# Patient Record
Sex: FEMALE | Race: BLACK OR AFRICAN AMERICAN | NOT HISPANIC OR LATINO | Employment: FULL TIME | URBAN - METROPOLITAN AREA
[De-identification: names, ages, dates, MRNs, and addresses within clinical notes are randomized per-mention and may not be internally consistent; named-entity substitution may affect disease eponyms.]

---

## 2017-02-15 ENCOUNTER — PATIENT MESSAGE (OUTPATIENT)
Dept: INTERNAL MEDICINE | Facility: CLINIC | Age: 27
End: 2017-02-15

## 2017-02-15 ENCOUNTER — OFFICE VISIT (OUTPATIENT)
Dept: SPORTS MEDICINE | Facility: CLINIC | Age: 27
End: 2017-02-15
Payer: COMMERCIAL

## 2017-02-15 ENCOUNTER — HOSPITAL ENCOUNTER (OUTPATIENT)
Dept: RADIOLOGY | Facility: HOSPITAL | Age: 27
Discharge: HOME | End: 2017-02-15
Attending: FAMILY MEDICINE
Payer: COMMERCIAL

## 2017-02-15 ENCOUNTER — TELEPHONE (OUTPATIENT)
Dept: INTERNAL MEDICINE | Facility: CLINIC | Age: 27
End: 2017-02-15

## 2017-02-15 VITALS — WEIGHT: 149 LBS | BODY MASS INDEX: 23.39 KG/M2 | HEIGHT: 67 IN | TEMPERATURE: 98 F

## 2017-02-15 DIAGNOSIS — M25.519 SHOULDER PAIN, UNSPECIFIED CHRONICITY, UNSPECIFIED LATERALITY: Primary | ICD-10-CM

## 2017-02-15 DIAGNOSIS — M25.511 RIGHT SHOULDER PAIN, UNSPECIFIED CHRONICITY: ICD-10-CM

## 2017-02-15 DIAGNOSIS — M12.811 ROTATOR CUFF ARTHROPATHY, RIGHT: Primary | ICD-10-CM

## 2017-02-15 PROCEDURE — 99243 OFF/OP CNSLTJ NEW/EST LOW 30: CPT | Mod: S$GLB,,, | Performed by: FAMILY MEDICINE

## 2017-02-15 PROCEDURE — 73030 X-RAY EXAM OF SHOULDER: CPT | Mod: TC,PO,RT

## 2017-02-15 PROCEDURE — 73030 X-RAY EXAM OF SHOULDER: CPT | Mod: 26,RT,, | Performed by: RADIOLOGY

## 2017-02-15 PROCEDURE — 99999 PR PBB SHADOW E&M-EST. PATIENT-LVL III: CPT | Mod: PBBFAC,,, | Performed by: FAMILY MEDICINE

## 2017-02-15 RX ORDER — MELOXICAM 7.5 MG/1
7.5 TABLET ORAL DAILY
Qty: 15 TABLET | Refills: 0 | Status: SHIPPED | OUTPATIENT
Start: 2017-02-15 | End: 2017-06-14

## 2017-02-15 NOTE — MR AVS SNAPSHOT
[facility]  [address]  [facility] [address]  Phone: [phone]                  Am Scot Whitehead   2/15/2017 2:15 PM   Appointment    Description:  Female : 1990   Provider:  Carrillo Vasquez MD   Department:  [facility]                To Do List           Goals (5 Years of Data)     None      [facility]     [facility] Nurse Care Line -  Assistance  Registered nurses in the [facility] Call Center provide clinical advisement, health education, appointment booking, and other advisory services.  Call for this free service at [phone].             Medications           Message regarding Medications     Verify the changes and/or additions to your medication regime listed below are the same as discussed with your clinician today.  If any of these changes or additions are incorrect, please notify your healthcare provider.             Verify that the below list of medications is an accurate representation of the medications you are currently taking.  If none reported, the list may be blank. If incorrect, please contact your healthcare provider. Carry this list with you in case of emergency.           Current Medications     fluticasone (FLONASE) 50 mcg/actuation nasal spray 1 spray by Each Nare route once daily.           Clinical Reference Information           Allergies as of 2/15/2017     No Known Allergies      Immunizations Administered on Date of Encounter - 2/15/2017     None      Language Assistance Services     ATTENTION: Language assistance services are available, free of charge. Please call [phone].      ATENCIÓN: Si habla español, tiene a sena disposición servicios gratuitos de asistencia lingüística. Llame al [phone].     CHÚ Ý: N?u b?n nói Ti?ng Vi?t, có các d?ch v? h? tr? ngôn ng? mi?n phí dành cho b?n. G?i s? [phone].         [facility] complies with applicable Federal civil rights  laws and does not discriminate on the basis of race, color, national origin, age, disability, or sex.

## 2017-02-15 NOTE — PROGRESS NOTES
Coby Ellison Erinn Whitehead, a 26 y.o. female, is here for evaluation of RIGHT shoulder pain.     This patient visit is a consult from the following provider: Bethany Arnold MD  Today's office visit notes will be made available to the consulting/refering provider via the mail, a fax, and/or an in basket message through the electronic medical record    HISTORY OF PRESENT ILLNESS  Hand dominance, right    Location:  anterior and lateral, right  Onset:  Insidious, early February 2017   Palliative:     Relative rest   Oral analgesics   Ice  Provocative:    ADLS  Yoga/aerial activities   Prior:  none  Progression:  worsening discomfort  Quality:    Sharp with activity  Radiation:  none  Severity:  per nursing documentation  Timing:  intermittent with use  Trauma:  None known    Review of systems (ROS):  A 10+ review of systems was performed with pertinent positives and negatives noted above in the history of present illness. Other systems were negative unless otherwise specified.    PHYSICAL EXAMINATION  General:  The patient is alert and oriented x 3. Mood is pleasant. Observation of ears, eyes and nose reveal no gross abnormalities. HEENT: NCAT, sclera anicteric. Lungs: Respirations are equal and unlabored.     RIGHT SHOULDER EXAMINATION     Observation:   Swelling:  none   Deformity:  none  Discoloration: none     Scapular: winging none  Scars: none     Atrophy:  none     Tenderness / Crepitus (T/C):   T/C         T/C  Clavicle    -/-   Supraspinatus    -/-    AC Jt.     -/-   Infraspinatus   -/-   SC Jt.      -/-   Deltoid      -/-     G. Tuberosity    -/-   LH Bicep groove  -/-  Acromion:   -/-   Midline Neck     -/-    Scapular Spine  -/-   Trapezium    -/-  SMA Scapula   -/-   GH jt. line - post   -/-    Scapulothoracic  -/-     ROM: (* = with pain)     Left shoulder    Right shoulder   AROM (PROM)    AROM (PROM)  FE       170° (175°)      170° (175°)*   ER at 0°      60° (65°)      90° (90°)*  ER at 90° ABD    90° (90°)      90° (90°)*  IR at 90° ABD    NA (40°)      NA (40°)*    IR (spine level)    T10       T10       Strength: (* = with pain)   Left shoulder    Right shoulder  SCAPTION    5/5      4/5*   IR      5/5       5/5  ER      5/5       5/5  BICEPS    5/5       5/5  Deltoid     5/5       5/5       Signs: Painful side      NEER     -       OOBDULIAS    +   DIEGO     +       SPEEDS    +    DROP ARM     -     BELLY PRESS  -  Superior escape  none      LIFT-OFF   +  X-Body ADD      +       MOVING VALGUS  -         Stability Testing:    Left shoulder    Right shoulder   Translation    Anterior   up face       up face  Posterior    up face      up face  Sulcus     < 10mm     < 10 mm     Signs  Apprehension    neg        neg      Relocation     no change      no change     Jerk test    neg        neg     Extremity Neuro-vascular Testing:  Sensation grossly intact to light touch all dermatomal regions.   DTR 2+ Biceps, Triceps, BR and Negative Alfreds sign  Grossly intact motor function at Elbow, Wrist and Hand  Distal pulses radial and ulnar 2+, brisk cap refill, symmetric.       NECK: Painless FROM and spinous processes non-tender. Negative Spurlings sign.      Other Findings:    ASSESSMENT & PLAN   Assessment:   #1 supraspinatus tendinosis, right   W/ suspected glenoid labral inflammatory changes    No evidence of osseous pathology  No evidence of neurologic pathology  No evidence of vascular pathology    Imaging studies reviewed:   X-ray shoulder, right 17.02    Plan:  We discussed options including:  #1 watchful waiting  #2 physical therapy aimed at:   RoM glenohumeral joint   Strengthening rotator cuff   Scapular stability  #3 injection therapy:   CSI SAB    Right,     Left,    CSI iaGH    Right,     Left,    orthobiologics   #4 MRIa for further evaluation     The patient chooses #2    Pain management: handout given, #3 = meloxicam x 14 d  Bracing:   Physical therapy: fPT, @ [facility], begin as  above  Activity (e.g. sports, work) restrictions: as tolerated   school/vocation: yoga, aerial yoga    Follow up in 8-10 w  A/e fPT  Ineffective-->MRIa  Should symptoms worsen or fail to resolve, consider:  Revisiting the above options

## 2017-02-15 NOTE — LETTER
February 15, 2017      Bethany Arnold MD  [address]  [facility] [address]           [facility]  [address]  [facility] [address]  Phone: [phone]          Patient: Coby Whitehead   MR Number: [id number]   YOB: 1990   Date of Visit: 2/15/2017       Dear Dr. Bethany Arnold:    Thank you for referring Coby Whitehead to me for evaluation. Attached you will find relevant portions of my assessment and plan of care.    If you have questions, please do not hesitate to call me. I look forward to following Coby Whitehead along with you.    Sincerely,    Carrillo Vasquez MD    Enclosure  CC:  No Recipients    If you would like to receive this communication electronically, please contact [email or web address] or [phone] to request more information on [employer] access.    For providers and/or their staff who would like to refer a patient to [facility], please contact us through our one-stop-shop provider referral line, [facility], at [phone].    If you feel you have received this communication in error or would no longer like to receive these types of communications, please e-mail [email or web address]

## 2017-03-01 ENCOUNTER — CLINICAL SUPPORT (OUTPATIENT)
Dept: REHABILITATION | Facility: HOSPITAL | Age: 27
End: 2017-03-01
Attending: FAMILY MEDICINE
Payer: COMMERCIAL

## 2017-03-01 DIAGNOSIS — M25.511 RIGHT ANTERIOR SHOULDER PAIN: Primary | ICD-10-CM

## 2017-03-01 PROCEDURE — 97110 THERAPEUTIC EXERCISES: CPT | Performed by: PHYSICAL THERAPIST

## 2017-03-01 PROCEDURE — 97161 PT EVAL LOW COMPLEX 20 MIN: CPT | Performed by: PHYSICAL THERAPIST

## 2017-03-01 NOTE — PROGRESS NOTES
[facility]   PATIENT EVALUATION    Date: 03/01/2017  Start Time: 1:30  Stop Time: 2:30    Patient Name: Coby Whitehead  Clinic Number: [id number]  Age: 26 y.o.  Gender: female    Diagnosis:   Encounter Diagnosis   Name Primary?    Right anterior shoulder pain Yes       Referring Physician: Carrillo Vasquez, *  Treatment Orders: PT Eval and Treat      History     Past Medical History:   Diagnosis Date    Rhinitis     nonallergic/seasonal        Current Outpatient Prescriptions   Medication Sig    fluticasone (FLONASE) 50 mcg/actuation nasal spray 1 spray by Each Nare route once daily.    meloxicam (MOBIC) 7.5 MG tablet Take 1 tablet (7.5 mg total) by mouth once daily.     No current facility-administered medications for this visit.        Review of patient's allergies indicates:  No Known Allergies      Subjective     History of Present Condition: Pt reports having right shoulder pain for the past weeks from doing aerials (exercise based). Reports I was trying to do a bird of paradise - that night it was sore and the next day was very sore. Pt continued to have pain over the next few weeks. 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    Onset Date: 4 weeks ago  Date of Surgery: n/a  Precautions: none    Mechanism of Injury: sudden    Pain Location: right shoulder pain   Pain Description: Aching, Dull, Burning and Sharp  Current Pain: 0/10  Least Pain: 0/10  Worst Pain: 7/10  Aggravating Factors: IR, overhead motion  Relieving Factors: rest.ice, heat    Prior Therapy: with good success after bilateral knee surgeries.    Occupation: [employer]    Sports/Recreational Activities: aerials, working out, running, basketball  Extremity Dominance: R    Prior Level of Function: Independent  Functional Deficits Leading to Referral/Nature of Injury: none  Patient Therapy Goals: To get normal function back for aerial  competition  Cultural/Environmental/Spiritual Barriers to Treatment or Learning: none      Objective       Posture: increased right shoulder protraction      Palpation: TTP along right proximal biceps tendon and pec minor insertion    Shoulder Range of Motion:   Right shoulder  ER: 90  IR: 21    Left shoulder  ER: 92  IR: 48    Joint Mobility: increased anterior humeral head translation on right    Strength:   Right shoulder  Flex: 4/5  Abd: 4/5  IR: 5/5  ER: 3+/5    Left shoulder  Flex: 5/5  Abd: 5/5  IR: 5/5  ER: 4+/5    Scapular Control/Dyskinesis:      Normal / Subtle / Obvious Comments   Left Obvious Inferior angle/medial border   Right Obvious  Inferior angle/medial border     Special Tests: Right: + load and shift, + Graham's, + Hawkin's Diego, + Neer's, + Apprehension relocation - all on right      Treatment:   SL ER 2# 3x10  Prone ext 2# 3x10  ER walkout 3x10      Functional Limitations Reports - G Codes  Category: Mobility  Tool: FOTO  Score: 65                Assessment     This is a 26 y.o. female referred to outpatient physical therapy and presents with a medical diagnosis of right shoulder pain and demonstrates limitations as described in the problem list. Pt demonstrates good rehab potential. Pt will benefit from physcial therapy services in order to maximize pain free and/or functional use of right UE/shoulder. The following goals were discussed with the patient and patient is in agreement with them as to be addressed in the treatment plan. Pt was given a HEP consisting of functional scapular PREs. Pt verbally understood the instructions as they were given and demonstrated proper form and technique during therapy. Pt was advised to perform these exercises free of pain, and to stop performing them if pain occurs.     Medical necessity is demonstrated by the following problem list:   - Pain limits function of effected part for all activities  - Unable to participate in daily activities   - Requires  skilled supervision to complete and progress HEP  - Fall risk - impaired balance   - Continued inability to participate in vocational pursuits    Short Term Goals (6 Weeks):  - Pt will increase ROM of right shoulder IR = left shoulder IR.  - Pt will increase strength of right shoulder = grossly 4+/5  - Decrease Pain to 1/10 worst with therex  - Pt to self correct posture independently.  - Pt independent with HEP with progressions.     Long Term Goals (12 Weeks):  - Pt will increase ROM with right shoulder = negative impingement testing.  - Pt will increase strength of right shoulder = grossly 5/5 with subtle scapular winging.   - Decrease Pain to 0/10 with aerials.  - Pt to return to aerial yoga without c/o pain or instability.      Plan     Pt will be treated by physical therapy 1-2 times a week for 12 weeks for manual therapy, therapeutic exercise, home exercise program, patient education, and modalities PRN to achieve established goals. Am Asa may at times be seen by a PTA as part of the Rehab Team.     Therapist: MAXIMINO BYRNES, PT    I CERTIFY THE NEED FOR THESE SERVICES FURNISHED UNDER THIS PLAN OF TREATMENT AND WHILE UNDER MY CARE    Physician's comments: ________________________________________________________________________________________________________________________________________________    Physician's Name: ___________________________________

## 2017-03-06 ENCOUNTER — CLINICAL SUPPORT (OUTPATIENT)
Dept: REHABILITATION | Facility: HOSPITAL | Age: 27
End: 2017-03-06
Attending: FAMILY MEDICINE
Payer: COMMERCIAL

## 2017-03-06 DIAGNOSIS — M25.511 RIGHT ANTERIOR SHOULDER PAIN: Primary | ICD-10-CM

## 2017-03-06 PROCEDURE — 97110 THERAPEUTIC EXERCISES: CPT | Performed by: PHYSICAL THERAPIST

## 2017-03-08 NOTE — PROGRESS NOTES
Physical Therapy Daily Note     Date: 03/08/2017  Name: Coby Whitehead  Clinic Number: [id number]  Diagnosis:   Encounter Diagnosis   Name Primary?    Right anterior shoulder pain Yes     Physician: Carrillo Vasquez, *    Evaluation Date: 3/1/17  Date of Surgery: n/a  Visit #: 2  Start Time:  11:30  Stop Time:  12:30  Total Treatment Time: 60    Precautions: none    Subjective     Pt reports the right shoulder is sore but feeling angelica.r     Pain: 1/10    Objective     Measurements taken: none    Patient received individual therapy to increase strength, endurance and ROM with activities as follows:     Am Asa received therapeutic exercises to develop strength, endurance and ROM for 30 minutes including:   SL ER 2# 3x10  Prone ext 2# 3x10  SA punch 2# 3x10  ER walkout 3x10  No money 3x10  Ball wall CW, CCW x 20 ea  LM with ER 3x10      Am Asa received the following manual therapy techniques: Joint mobilizations and Soft tissue Mobilization were applied to the: right shoulder for 5 minutes.   Sleeper stretch  IR with post mobs    Written Home Exercises Provided: updated as per therex list  Pt demo good understanding of the education provided. Am Asa demonstrated good return demonstration of activities.     Education provided:  Am Asa verbalized good understanding of education provided.   No spiritual or educational barriers to learning identified.    Assessment     This is a 26 y.o. female referred to outpatient physical therapy and presents with a medical diagnosis of right anterior shoulder pain and demonstrates limitations as described in the problem list Pt prognosis is Good. Pt will continue to benefit from skilled outpatient physical therapy to address the deficits listed below in the problem list, provide pt/family education and to maximize pt's level of independence in the home and community environment.    Will the patient continue to benefit from  skilled PT intervention? yes      Medical necessity is demonstrated by:   - Pain limits function of effected part for all activities  - Unable to participate in daily activities   - Requires skilled supervision to complete and progress HEP  - Fall risk - impaired balance   - Continued inability to participate in vocational pursuits    Short Term Goals (6 Weeks):  - Pt will increase ROM of right shoulder IR = left shoulder IR.  - Pt will increase strength of right shoulder = grossly 4+/5  - Decrease Pain to 1/10 worst with therex  - Pt to self correct posture independently.  - Pt independent with HEP with progressions.      Long Term Goals (12 Weeks):  - Pt will increase ROM with right shoulder = negative impingement testing.  - Pt will increase strength of right shoulder = grossly 5/5 with subtle scapular winging.   - Decrease Pain to 0/10 with aerials.  - Pt to return to aerial yoga without c/o pain or instability.     Plan   Continue with established Plan of Care towards PT goals.      Therapist: MAXIMINO BYRNES, PT

## 2017-03-15 ENCOUNTER — CLINICAL SUPPORT (OUTPATIENT)
Dept: REHABILITATION | Facility: HOSPITAL | Age: 27
End: 2017-03-15
Attending: FAMILY MEDICINE
Payer: COMMERCIAL

## 2017-03-15 DIAGNOSIS — M25.511 RIGHT ANTERIOR SHOULDER PAIN: Primary | ICD-10-CM

## 2017-03-15 PROCEDURE — 97110 THERAPEUTIC EXERCISES: CPT | Performed by: PHYSICAL THERAPIST

## 2017-03-15 NOTE — PROGRESS NOTES
Physical Therapy Daily Note     Date: 03/15/2017  Name: Coby Whitehead  Clinic Number: [id number]  Diagnosis:   Encounter Diagnosis   Name Primary?    Right anterior shoulder pain Yes     Physician: Carrillo Vasquez, *    Evaluation Date: 3/1/17  Date of Surgery: n/a  Visit #: 3  Start Time:  11:30  Stop Time:  12:30  Total Treatment Time: 60    Precautions: none    Subjective     Pt reports the right shoulder is sore still.     Pain: 1/10    Objective     Measurements taken: none    Patient received individual therapy to increase strength, endurance and ROM with activities as follows:     Am Asa received therapeutic exercises to develop strength, endurance and ROM for 30 minutes including:   SL ER 2# 3x10  Prone ext 2# 3x10  SA punch 2# 3x10  ER walkout 3x10  No money 3x10  Ball wall CW, CCW x 20 ea  LM with ER 3x10  90/90 walkout 3x10  Combo 3x10        Am Asa received the following manual therapy techniques: Joint mobilizations and Soft tissue Mobilization were applied to the: right shoulder for 5 minutes.   Sleeper stretch  IR with post mobs    Written Home Exercises Provided: updated as per therex list  Pt demo good understanding of the education provided. Am Asa demonstrated good return demonstration of activities.     Education provided:  Am Asa verbalized good understanding of education provided.   No spiritual or educational barriers to learning identified.    Assessment     Tender along proximal biceps tendon. Pt continues to display anterior translation/instability. Continue to progress strength at higher ranges.    This is a 27 y.o. female referred to outpatient physical therapy and presents with a medical diagnosis of right anterior shoulder pain and demonstrates limitations as described in the problem list Pt prognosis is Good. Pt will continue to benefit from skilled outpatient physical therapy to address the deficits listed below in the  problem list, provide pt/family education and to maximize pt's level of independence in the home and community environment.    Will the patient continue to benefit from skilled PT intervention? yes      Medical necessity is demonstrated by:   - Pain limits function of effected part for all activities  - Unable to participate in daily activities   - Requires skilled supervision to complete and progress HEP  - Fall risk - impaired balance   - Continued inability to participate in vocational pursuits    Short Term Goals (6 Weeks):  - Pt will increase ROM of right shoulder IR = left shoulder IR.  - Pt will increase strength of right shoulder = grossly 4+/5  - Decrease Pain to 1/10 worst with therex  - Pt to self correct posture independently.  - Pt independent with HEP with progressions.      Long Term Goals (12 Weeks):  - Pt will increase ROM with right shoulder = negative impingement testing.  - Pt will increase strength of right shoulder = grossly 5/5 with subtle scapular winging.   - Decrease Pain to 0/10 with aerials.  - Pt to return to aerial yoga without c/o pain or instability.     Plan   Continue with established Plan of Care towards PT goals.      Therapist: MAXIMINO BYRNES, PT

## 2017-03-29 ENCOUNTER — CLINICAL SUPPORT (OUTPATIENT)
Dept: REHABILITATION | Facility: HOSPITAL | Age: 27
End: 2017-03-29
Attending: FAMILY MEDICINE
Payer: COMMERCIAL

## 2017-03-29 DIAGNOSIS — M25.511 RIGHT ANTERIOR SHOULDER PAIN: Primary | ICD-10-CM

## 2017-03-29 PROCEDURE — 97110 THERAPEUTIC EXERCISES: CPT | Performed by: PHYSICAL THERAPIST

## 2017-03-29 NOTE — PROGRESS NOTES
Physical Therapy Daily Note     Date: 03/29/2017  Name: Coby Whitehead  Clinic Number: [id number]  Diagnosis:   Encounter Diagnosis   Name Primary?    Right anterior shoulder pain Yes     Physician: Carrillo Vasquez, *    Evaluation Date: 3/1/17  Date of Surgery: n/a  Visit #: 4  Start Time:  11:30  Stop Time:  12:30  Total Treatment Time: 60    Precautions: none    Subjective     Pt reports the right shoulder is feeling better.     Pain: 1/10    Objective     Measurements taken: none    Patient received individual therapy to increase strength, endurance and ROM with activities as follows:     Am Asa received therapeutic exercises to develop strength, endurance and ROM for 40 minutes including:   SL ER 2# 3x10  Prone ext 2# 3x10  SA punch 2# 3x10  ER walkout 3x10  No money 3x10  Ball wall CW, CCW x 20 ea  LM with ER 3x10  90/90 walkout 3x10  Combo 3x10  Sit to stand D2 3x10  6 in step overs 3x10   ball wall arc 3x3        Am Asa received the following manual therapy techniques: Joint mobilizations and Soft tissue Mobilization were applied to the: right shoulder for 5 minutes.   Sleeper stretch  IR with post mobs    Written Home Exercises Provided: updated as per therex list  Pt demo good understanding of the education provided. Am Asa demonstrated good return demonstration of activities.     Education provided:  Am Asa verbalized good understanding of education provided.   No spiritual or educational barriers to learning identified.    Assessment     Shoulder stability and end range IR continue to improve with rx. Continue to progress closed chain loading and strength at 90/90/    This is a 27 y.o. female referred to outpatient physical therapy and presents with a medical diagnosis of right anterior shoulder pain and demonstrates limitations as described in the problem list Pt prognosis is Good. Pt will continue to benefit from skilled outpatient  physical therapy to address the deficits listed below in the problem list, provide pt/family education and to maximize pt's level of independence in the home and community environment.    Will the patient continue to benefit from skilled PT intervention? yes      Medical necessity is demonstrated by:   - Pain limits function of effected part for all activities  - Unable to participate in daily activities   - Requires skilled supervision to complete and progress HEP  - Fall risk - impaired balance   - Continued inability to participate in vocational pursuits    Short Term Goals (6 Weeks):  - Pt will increase ROM of right shoulder IR = left shoulder IR.  - Pt will increase strength of right shoulder = grossly 4+/5  - Decrease Pain to 1/10 worst with therex  - Pt to self correct posture independently.  - Pt independent with HEP with progressions.      Long Term Goals (12 Weeks):  - Pt will increase ROM with right shoulder = negative impingement testing.  - Pt will increase strength of right shoulder = grossly 5/5 with subtle scapular winging.   - Decrease Pain to 0/10 with aerials.  - Pt to return to aerial yoga without c/o pain or instability.     Plan   Continue with established Plan of Care towards PT goals.      Therapist: MAXIMINO BYRNES, PT

## 2017-04-03 ENCOUNTER — CLINICAL SUPPORT (OUTPATIENT)
Dept: REHABILITATION | Facility: HOSPITAL | Age: 27
End: 2017-04-03
Attending: FAMILY MEDICINE
Payer: COMMERCIAL

## 2017-04-03 DIAGNOSIS — M25.511 RIGHT ANTERIOR SHOULDER PAIN: Primary | ICD-10-CM

## 2017-04-03 PROCEDURE — 97110 THERAPEUTIC EXERCISES: CPT | Performed by: PHYSICAL THERAPIST

## 2017-04-03 NOTE — PROGRESS NOTES
Physical Therapy Daily Note     Date: 04/03/2017  Name: Coby Whitehead  Clinic Number: [id number]  Diagnosis:   Encounter Diagnosis   Name Primary?    Right anterior shoulder pain Yes     Physician: Carrillo Vasquez, *    Evaluation Date: 3/1/17  Date of Surgery: n/a  Visit #: 5  Start Time:  10:30  Stop Time:  11:30  Total Treatment Time: 60    Precautions: none    Subjective     Pt reports the right shoulder is feeling better.     Pain: 1/10    Objective     Measurements taken: none    Patient received individual therapy to increase strength, endurance and ROM with activities as follows:     Am Asa received therapeutic exercises to develop strength, endurance and ROM for 40 minutes including:   SL ER 2# 3x10  Prone ext 2# 3x10  SA punch 2# 3x10  ER walkout 3x10  No money 3x10  Ball wall CW, CCW x 20 ea  LM with ER 3x10  90/90 walkout 3x10  Combo 3x10  Sit to stand D2 3x10  6 in step overs 3x10   ball wall arc 3x3        Am Asa received the following manual therapy techniques: Joint mobilizations and Soft tissue Mobilization were applied to the: right shoulder for 5 minutes.   Sleeper stretch  IR with post mobs    Written Home Exercises Provided: updated as per therex list  Pt demo good understanding of the education provided. Am Asa demonstrated good return demonstration of activities.     Education provided:  Am Asa verbalized good understanding of education provided.   No spiritual or educational barriers to learning identified.    Assessment     Closed chain shoulder stability continues to improve with rx. Prepare for DC in next few visits.     This is a 27 y.o. female referred to outpatient physical therapy and presents with a medical diagnosis of right anterior shoulder pain and demonstrates limitations as described in the problem list Pt prognosis is Good. Pt will continue to benefit from skilled outpatient physical therapy to address the  deficits listed below in the problem list, provide pt/family education and to maximize pt's level of independence in the home and community environment.    Will the patient continue to benefit from skilled PT intervention? yes      Medical necessity is demonstrated by:   - Pain limits function of effected part for all activities  - Unable to participate in daily activities   - Requires skilled supervision to complete and progress HEP  - Fall risk - impaired balance   - Continued inability to participate in vocational pursuits    Short Term Goals (6 Weeks):  - Pt will increase ROM of right shoulder IR = left shoulder IR.  - Pt will increase strength of right shoulder = grossly 4+/5  - Decrease Pain to 1/10 worst with therex  - Pt to self correct posture independently.  - Pt independent with HEP with progressions.      Long Term Goals (12 Weeks):  - Pt will increase ROM with right shoulder = negative impingement testing.  - Pt will increase strength of right shoulder = grossly 5/5 with subtle scapular winging.   - Decrease Pain to 0/10 with aerials.  - Pt to return to aerial yoga without c/o pain or instability.     Plan   Continue with established Plan of Care towards PT goals.      Therapist: MAXIMINO BYRNES, PT

## 2017-04-10 ENCOUNTER — CLINICAL SUPPORT (OUTPATIENT)
Dept: REHABILITATION | Facility: HOSPITAL | Age: 27
End: 2017-04-10
Attending: FAMILY MEDICINE
Payer: COMMERCIAL

## 2017-04-10 DIAGNOSIS — M25.511 RIGHT ANTERIOR SHOULDER PAIN: Primary | ICD-10-CM

## 2017-04-10 PROCEDURE — 97110 THERAPEUTIC EXERCISES: CPT | Performed by: PHYSICAL THERAPIST

## 2017-04-10 NOTE — PROGRESS NOTES
Physical Therapy Daily Note     Date: 04/10/2017  Name: Coby Whitehead  Clinic Number: [id number]  Diagnosis:   Encounter Diagnosis   Name Primary?    Right anterior shoulder pain Yes     Physician: aCrrillo Vasquez, *    Evaluation Date: 3/1/17  Date of Surgery: n/a  Visit #: 6  Start Time:  11:30  Stop Time:  12:30  Total Treatment Time: 60    Precautions: none    Subjective     Pt reports the right shoulder is doing well. It only hurts with certain stretches but it is better.     Pain: 1/10    Objective     Measurements taken: none    Patient received individual therapy to increase strength, endurance and ROM with activities as follows:     Am Asa received therapeutic exercises to develop strength, endurance and ROM for 25 minutes including:   SL ER 3# 3x10  Prone ext 3# 3x10  ER walkout 3x10  No money 3x10  np Ball wall CW, CCW x 20 ea  LM with ER 3x10  90/90 walkout 3x10  Combo 3x10  Lunge with D2 3x10  np 6 in step overs 3x10          Am Asa received the following manual therapy techniques: Joint mobilizations and Soft tissue Mobilization were applied to the: right shoulder for 5 minutes.   Sleeper stretch  IR with post mobs    Written Home Exercises Provided: updated as per therex list  Pt demo good understanding of the education provided. Am Asa demonstrated good return demonstration of activities.     Education provided:  Am Asa verbalized good understanding of education provided.   No spiritual or educational barriers to learning identified.    FOTO = 87%    Assessment     Pt advised to avoid end range extension (combined with IR) stretch for her shoulder due to anterior instability. Otherwise excellent tolerance to posterior cuff and scapular stabilization therex at multiple ranges.     This is a 27 y.o. female referred to outpatient physical therapy and presents with a medical diagnosis of right anterior shoulder pain and demonstrates  limitations as described in the problem list Pt prognosis is Good. Pt will continue to benefit from skilled outpatient physical therapy to address the deficits listed below in the problem list, provide pt/family education and to maximize pt's level of independence in the home and community environment.    Will the patient continue to benefit from skilled PT intervention? yes      Medical necessity is demonstrated by:   - Pain limits function of effected part for all activities  - Unable to participate in daily activities   - Requires skilled supervision to complete and progress HEP  - Fall risk - impaired balance   - Continued inability to participate in vocational pursuits    Short Term Goals (6 Weeks):  - Pt will increase ROM of right shoulder IR = left shoulder IR.  - Pt will increase strength of right shoulder = grossly 4+/5  - Decrease Pain to 1/10 worst with therex  - Pt to self correct posture independently.  - Pt independent with HEP with progressions.      Long Term Goals (12 Weeks):  - Pt will increase ROM with right shoulder = negative impingement testing.  - Pt will increase strength of right shoulder = grossly 5/5 with subtle scapular winging.   - Decrease Pain to 0/10 with aerials.  - Pt to return to aerial yoga without c/o pain or instability.     Plan   Continue with established Plan of Care towards PT goals.      Therapist: MAXIMINO BYRNES, PT

## 2017-04-12 ENCOUNTER — OFFICE VISIT (OUTPATIENT)
Dept: SPORTS MEDICINE | Facility: CLINIC | Age: 27
End: 2017-04-12
Payer: COMMERCIAL

## 2017-04-12 VITALS — HEIGHT: 67 IN | WEIGHT: 146 LBS | TEMPERATURE: 98 F | BODY MASS INDEX: 22.91 KG/M2

## 2017-04-12 DIAGNOSIS — M25.511 CHRONIC RIGHT SHOULDER PAIN: ICD-10-CM

## 2017-04-12 DIAGNOSIS — M12.811 ROTATOR CUFF ARTHROPATHY, RIGHT: Primary | ICD-10-CM

## 2017-04-12 DIAGNOSIS — G89.29 CHRONIC RIGHT SHOULDER PAIN: ICD-10-CM

## 2017-04-12 PROCEDURE — 1160F RVW MEDS BY RX/DR IN RCRD: CPT | Mod: S$GLB,,, | Performed by: FAMILY MEDICINE

## 2017-04-12 PROCEDURE — 99999 PR PBB SHADOW E&M-EST. PATIENT-LVL III: CPT | Mod: PBBFAC,,, | Performed by: FAMILY MEDICINE

## 2017-04-12 PROCEDURE — 99214 OFFICE O/P EST MOD 30 MIN: CPT | Mod: S$GLB,,, | Performed by: FAMILY MEDICINE

## 2017-04-12 NOTE — MR AVS SNAPSHOT
[facility]  [address]  [facility] [address]  Phone: [phone]                  Am Scot Whitehead   2017 1:15 PM   Appointment    Description:  Female : 1990   Provider:  Carrillo Vasquez MD   Department:  [facility]                To Do List           Future Appointments        Provider Department Dept Phone    2017 1:15 PM Carrillo Vasquez MD [facility] [phone]    2017 5:30 PM Noe Robledo PT [facility] [phone]    2017 4:30 PM Noe Robledo PT [facility] [phone]      Goals (5 Years of Data)     None      [facility]     [facility] Care Line -  Assistance  Unless otherwise directed by your provider, please contact [facility], our nurse care line that is available for  assistance.     Registered nurses in the [facility] Center provide: appointment scheduling, clinical advisement, health education, and other advisory services.  Call: [phone] (toll free)               Medications           Message regarding Medications     Verify the changes and/or additions to your medication regime listed below are the same as discussed with your clinician today.  If any of these changes or additions are incorrect, please notify your healthcare provider.             Verify that the below list of medications is an accurate representation of the medications you are currently taking.  If none reported, the list may be blank. If incorrect, please contact your healthcare provider. Carry this list with you in case of emergency.           Current Medications     fluticasone (FLONASE) 50 mcg/actuation nasal spray 1 spray by Each Nare route once daily.    meloxicam (MOBIC) 7.5 MG tablet Take 1 tablet (7.5 mg total) by mouth once daily.           Clinical Reference Information           Allergies as of 2017     No Known  Allergies      Immunizations Administered on Date of Encounter - 4/12/2017     None      Language Assistance Services     ATTENTION: Language assistance services are available, free of charge. Please call [phone].      ATENCIÓN: Si burak lovett, tiene a sena disposición servicios gratuitos de asistencia lingüística. Llame al [phone].     CHÚ Ý: N?u b?n nói Ti?ng Vi?t, có các d?ch v? h? tr? ngôn ng? mi?n phí dành cho b?n. G?i s? [phone].         [facility] complies with applicable Federal civil rights laws and does not discriminate on the basis of race, color, national origin, age, disability, or sex.

## 2017-04-12 NOTE — PROGRESS NOTES
Coby Ellison Erinn Whitehead, a 27 y.o. female, is here for evaluation of RIGHT shoulder pain.     This patient visit is a consult from the following provider: Bethany Arnold MD  Today's office visit notes will be made available to the consulting/refering provider via the mail, a fax, and/or an in basket message through the electronic medical record    HISTORY OF PRESENT ILLNESS  Hand dominance, right    Location:  anterior and lateral, right  Onset:  Insidious, early February 2017   Palliative:     Relative rest   Oral analgesics   Ice   fPT @ [facility], [facility], improved strength, lingering pain remains  Provocative:    Decreased pain with    ADLs  Yoga/aerial activities   Prior:  none  Progression:  Slowly resolving discomfort  Quality:    Sharp with activity  Radiation:  none  Severity:  per nursing documentation  Timing:  intermittent with use  Trauma:  None known    Review of systems (ROS):  A 10+ review of systems was performed with pertinent positives and negatives noted above in the history of present illness. Other systems were negative unless otherwise specified.    PHYSICAL EXAMINATION  General:  The patient is alert and oriented x 3. Mood is pleasant. Observation of ears, eyes and nose reveal no gross abnormalities. HEENT: NCAT, sclera anicteric. Lungs: Respirations are equal and unlabored.     RIGHT SHOULDER EXAMINATION     Observation:   Swelling:  none   Deformity:  none  Discoloration: none     Scapular: winging none  Scars: none     Atrophy:  none     Tenderness / Crepitus (T/C):   T/C         T/C  Clavicle    -/-   Supraspinatus    -/-    AC Jt.     -/-   Infraspinatus   -/-   SC Jt.      -/-   Deltoid      -/-     G. Tuberosity    -/-   LH Bicep groove  -/-  Acromion:   -/-   Midline Neck     -/-    Scapular Spine  -/-   Trapezium    -/-  SMA Scapula   -/-   GH jt. line - post   -/-    Scapulothoracic  -/-     ROM: (* = with pain)     Left shoulder    Right shoulder   AROM (PROM)    AROM  (PROM)  FE       170° (175°)      170° (175°)*   ER at 0°      60° (65°)      90° (90°)*  ER at 90° ABD   90° (90°)      90° (90°)*  IR at 90° ABD    NA (40°)      NA (40°)*    IR (spine level)    T10       T10       Strength: (* = with pain)   Left shoulder    Right shoulder  SCAPTION    5/5      4/5*   IR      5/5       5/5  ER      5/5       5/5  BICEPS    5/5       5/5  Deltoid     5/5       5/5       Signs: Painful side      NEER     -       OOBDULIAS    +   DIEGO     +       SPEEDS    +    DROP ARM     -     BELLY PRESS  -  Superior escape  none      LIFT-OFF   +  X-Body ADD      +       MOVING VALGUS  -         Stability Testing:    Left shoulder    Right shoulder   Translation    Anterior   up face       up face  Posterior    up face      up face  Sulcus     < 10mm     < 10 mm     Signs  Apprehension    neg        neg      Relocation     no change      no change     Jerk test    neg        neg     Extremity Neuro-vascular Testing:  Sensation grossly intact to light touch all dermatomal regions.   DTR 2+ Biceps, Triceps, BR and Negative Alfreds sign  Grossly intact motor function at Elbow, Wrist and Hand  Distal pulses radial and ulnar 2+, brisk cap refill, symmetric.       NECK: Painless FROM and spinous processes non-tender. Negative Spurlings sign.      Other Findings:    ASSESSMENT & PLAN   Assessment:   #1 concern for glenoid labral tearing, right    No evidence of osseous pathology  No evidence of neurologic pathology  No evidence of vascular pathology    Imaging studies reviewed:   X-ray shoulder, right 17.02    Plan:  Given extreme dysfunction and discomfort, coupled with physical examination suggesting glenoid labral pathology, and with non-diagnostic x-ray imaging, we will obtain MRI arthrogram imaging for further evaluation of the glenoid labrum and associated soft tissue structures of the shoulder.    [We discussed options including:  #1 watchful waiting  #2 continue physical therapy  aimed at:   RoM glenohumeral joint   Strengthening rotator cuff   Scapular stability  #3 injection therapy:   CSI SAB    Right,     Left,    CSI iaGH    Right,     Left,    orthobiologics   #4      The patient chooses #2    Pain management: handout given, #3 = meloxicam (prior)  Bracing:   Physical therapy:    fPT, @ [facility], continue as above (DDixon)  Activity (e.g. sports, work) restrictions: as tolerated   school/vocation: yoga, aerial yoga, undergrad at [employer]     Follow up after MRIa  Should symptoms worsen or fail to resolve, consider:  Revisiting the above options

## 2017-04-12 NOTE — LETTER
April 12, 2017      Bethany Arnold MD  [address]  [facility] [address]           [facility]  [address]  [facility] [address]  Phone: [phone]          Patient: Coby Whitehead   MR Number: [id number]   YOB: 1990   Date of Visit: 4/12/2017       Dear Dr. Bethany Arnold:    Thank you for referring Coby Whitehead to me for evaluation. Attached you will find relevant portions of my assessment and plan of care.    If you have questions, please do not hesitate to call me. I look forward to following Coby Whitehead along with you.    Sincerely,    Carrillo Vasquez MD    Enclosure  CC:  No Recipients    If you would like to receive this communication electronically, please contact [email or web address] or [phone] to request more information on [employer] access.    For providers and/or their staff who would like to refer a patient to [facility], please contact us through our one-stop-shop provider referral line, [facility], at [phone].    If you feel you have received this communication in error or would no longer like to receive these types of communications, please e-mail [email or web address]

## 2017-04-17 ENCOUNTER — TELEPHONE (OUTPATIENT)
Dept: SPORTS MEDICINE | Facility: CLINIC | Age: 27
End: 2017-04-17

## 2017-04-17 ENCOUNTER — CLINICAL SUPPORT (OUTPATIENT)
Dept: REHABILITATION | Facility: HOSPITAL | Age: 27
End: 2017-04-17
Attending: FAMILY MEDICINE
Payer: COMMERCIAL

## 2017-04-17 DIAGNOSIS — M25.511 RIGHT ANTERIOR SHOULDER PAIN: Primary | ICD-10-CM

## 2017-04-17 PROCEDURE — 97110 THERAPEUTIC EXERCISES: CPT | Performed by: PHYSICAL THERAPIST

## 2017-04-17 NOTE — PROGRESS NOTES
Physical Therapy Daily Note     Date: 04/17/2017  Name: Coby Whitehead  Clinic Number: [id number]  Diagnosis:   Encounter Diagnosis   Name Primary?    Right anterior shoulder pain Yes     Physician: Carrillo Vasquez, *    Evaluation Date: 3/1/17  Date of Surgery: n/a  Visit #: 7  Start Time:  5:30  Stop Time:  6:30  Total Treatment Time: 60    Precautions: none    Subjective     Pt reports the right shoulder is doing great. I dont think I need more PT.     Pain: 1/10    Objective     Measurements taken: none    Patient received individual therapy to increase strength, endurance and ROM with activities as follows:     Am Asa received therapeutic exercises to develop strength, endurance and ROM for 25 minutes including:   SL ER 3# 3x10  Prone ext 3# 3x10  ER walkout 3x10  No money 3x10  np Ball wall CW, CCW x 20 ea  LM with ER 3x10  90/90 walkout 3x10  Combo 3x10  Lunge with D2 3x10  np 6 in step overs 3x10          Am Asa received the following manual therapy techniques: Joint mobilizations and Soft tissue Mobilization were applied to the: right shoulder for 5 minutes.   Sleeper stretch  IR with post mobs    Written Home Exercises Provided: updated as per therex list  Pt demo good understanding of the education provided. Am Asa demonstrated good return demonstration of activities.     Education provided:  Am Asa verbalized good understanding of education provided.   No spiritual or educational barriers to learning identified.    FOTO = 87%    Assessment     Excellent shoulder stability. Pt is independent with HEP and will try to maintain therex . To follow up with PT PRN.     This is a 27 y.o. female referred to outpatient physical therapy and presents with a medical diagnosis of right anterior shoulder pain and demonstrates limitations as described in the problem list Pt prognosis is Good. Pt will continue to benefit from skilled outpatient physical  therapy to address the deficits listed below in the problem list, provide pt/family education and to maximize pt's level of independence in the home and community environment.    Will the patient continue to benefit from skilled PT intervention? yes      Medical necessity is demonstrated by:   - Pain limits function of effected part for all activities  - Unable to participate in daily activities   - Requires skilled supervision to complete and progress HEP  - Fall risk - impaired balance   - Continued inability to participate in vocational pursuits    Short Term Goals (6 Weeks):  - Pt will increase ROM of right shoulder IR = left shoulder IR.  - Pt will increase strength of right shoulder = grossly 4+/5  - Decrease Pain to 1/10 worst with therex  - Pt to self correct posture independently.  - Pt independent with HEP with progressions.      Long Term Goals (12 Weeks):  - Pt will increase ROM with right shoulder = negative impingement testing.  - Pt will increase strength of right shoulder = grossly 5/5 with subtle scapular winging.   - Decrease Pain to 0/10 with aerials.  - Pt to return to aerial yoga without c/o pain or instability.     Plan   Continue with established Plan of Care towards PT goals.      Therapist: MAXIMINO BYRNES, PT

## 2017-04-17 NOTE — TELEPHONE ENCOUNTER
----- Message from Fiordaliza Galan sent at 4/17/2017 10:32 AM CDT -----  Contact: self/home  Pt would like to reschedule her MRI.

## 2017-04-25 ENCOUNTER — HOSPITAL ENCOUNTER (OUTPATIENT)
Dept: RADIOLOGY | Facility: HOSPITAL | Age: 27
Discharge: HOME | End: 2017-04-25
Attending: FAMILY MEDICINE
Payer: COMMERCIAL

## 2017-04-25 DIAGNOSIS — M12.811 ROTATOR CUFF ARTHROPATHY, RIGHT: ICD-10-CM

## 2017-04-25 PROCEDURE — 73222 MRI JOINT UPR EXTREM W/DYE: CPT | Mod: 26,RT,, | Performed by: RADIOLOGY

## 2017-04-25 PROCEDURE — 25500020 PHARM REV CODE 255: Performed by: FAMILY MEDICINE

## 2017-04-25 PROCEDURE — 73222 MRI JOINT UPR EXTREM W/DYE: CPT | Mod: TC,RT

## 2017-04-25 PROCEDURE — 25000003 PHARM REV CODE 250: Performed by: FAMILY MEDICINE

## 2017-04-25 PROCEDURE — 23350 INJECTION FOR SHOULDER X-RAY: CPT | Mod: ,,, | Performed by: RADIOLOGY

## 2017-04-25 PROCEDURE — 23350 INJECTION FOR SHOULDER X-RAY: CPT | Mod: TC

## 2017-04-25 PROCEDURE — 73040 CONTRAST X-RAY OF SHOULDER: CPT | Mod: 26,,, | Performed by: RADIOLOGY

## 2017-04-25 PROCEDURE — A9585 GADOBUTROL INJECTION: HCPCS | Performed by: FAMILY MEDICINE

## 2017-04-25 RX ORDER — LIDOCAINE HYDROCHLORIDE 10 MG/ML
2 INJECTION INFILTRATION; PERINEURAL ONCE
Status: COMPLETED | OUTPATIENT
Start: 2017-04-25 | End: 2017-04-25

## 2017-04-25 RX ORDER — GADOBUTROL 604.72 MG/ML
1 INJECTION INTRAVENOUS
Status: COMPLETED | OUTPATIENT
Start: 2017-04-25 | End: 2017-04-25

## 2017-04-25 RX ADMIN — IOHEXOL 2 ML: 300 INJECTION, SOLUTION INTRAVENOUS at 03:04

## 2017-04-25 RX ADMIN — LIDOCAINE HYDROCHLORIDE 2 ML: 10 INJECTION, SOLUTION INFILTRATION; PERINEURAL at 03:04

## 2017-04-25 RX ADMIN — GADOBUTROL 1 ML: 604.72 INJECTION INTRAVENOUS at 03:04

## 2017-04-26 ENCOUNTER — OFFICE VISIT (OUTPATIENT)
Dept: SPORTS MEDICINE | Facility: CLINIC | Age: 27
End: 2017-04-26
Payer: COMMERCIAL

## 2017-04-26 VITALS — WEIGHT: 146 LBS | BODY MASS INDEX: 22.91 KG/M2 | TEMPERATURE: 98 F | HEIGHT: 67 IN

## 2017-04-26 DIAGNOSIS — S43.431D SUPERIOR GLENOID LABRUM LESION OF RIGHT SHOULDER, SUBSEQUENT ENCOUNTER: Primary | ICD-10-CM

## 2017-04-26 PROCEDURE — 99999 PR PBB SHADOW E&M-EST. PATIENT-LVL III: CPT | Mod: PBBFAC,,, | Performed by: FAMILY MEDICINE

## 2017-04-26 PROCEDURE — 20610 DRAIN/INJ JOINT/BURSA W/O US: CPT | Mod: RT,S$GLB,, | Performed by: FAMILY MEDICINE

## 2017-04-26 PROCEDURE — 99214 OFFICE O/P EST MOD 30 MIN: CPT | Mod: 25,S$GLB,, | Performed by: FAMILY MEDICINE

## 2017-04-26 PROCEDURE — 1160F RVW MEDS BY RX/DR IN RCRD: CPT | Mod: S$GLB,,, | Performed by: FAMILY MEDICINE

## 2017-04-26 RX ORDER — TRIAMCINOLONE ACETONIDE 40 MG/ML
40 INJECTION, SUSPENSION INTRA-ARTICULAR; INTRAMUSCULAR
Status: DISCONTINUED | OUTPATIENT
Start: 2017-04-26 | End: 2017-04-26 | Stop reason: HOSPADM

## 2017-04-26 RX ADMIN — TRIAMCINOLONE ACETONIDE 40 MG: 40 INJECTION, SUSPENSION INTRA-ARTICULAR; INTRAMUSCULAR at 01:04

## 2017-04-26 NOTE — MR AVS SNAPSHOT
[facility]  [address]  [facility] [address]  Phone: [phone]                  Am Scot Whitehead   2017 1:15 PM   Appointment    Description:  Female : 1990   Provider:  Carrillo Vasquez MD   Department:  [facility]                To Do List           Future Appointments        Provider Department Dept Phone    2017 1:15 PM Carrillo Vasquez MD [facility] [phone]    5/3/2017 11:00 AM Liliana Campa NP [facility] [address] [phone]      Goals (5 Years of Data)     None      [facility]     [facility] On Call Nurse Care Line -  Assistance  Unless otherwise directed by your provider, please contact [facility], our nurse care line that is available for  assistance.     Registered nurses in the [facility] On Call Center provide: appointment scheduling, clinical advisement, health education, and other advisory services.  Call: [phone] (toll free)               Medications           Message regarding Medications     Verify the changes and/or additions to your medication regime listed below are the same as discussed with your clinician today.  If any of these changes or additions are incorrect, please notify your healthcare provider.             Verify that the below list of medications is an accurate representation of the medications you are currently taking.  If none reported, the list may be blank. If incorrect, please contact your healthcare provider. Carry this list with you in case of emergency.           Current Medications     fluticasone (FLONASE) 50 mcg/actuation nasal spray 1 spray by Each Nare route once daily.    meloxicam (MOBIC) 7.5 MG tablet Take 1 tablet (7.5 mg total) by mouth once daily.           Clinical Reference Information           Allergies as of 2017     No Known Allergies      Immunizations Administered on Date of Encounter - 2017     None       Language Assistance Services     ATTENTION: Language assistance services are available, free of charge. Please call [phone].      ATENCIÓN: Si habla bony, tiene a sena disposición servicios gratuitos de asistencia lingüística. Llame al [phone].     CHÚ Ý: N?u b?n nói Ti?ng Vi?t, có các d?ch v? h? tr? ngôn ng? mi?n phí dành cho b?n. G?i s? [phone].         [facility] complies with applicable Federal civil rights laws and does not discriminate on the basis of race, color, national origin, age, disability, or sex.

## 2017-04-26 NOTE — PROGRESS NOTES
Coby Ellison Erinn Whitehead, a 27 y.o. female, is here for evaluation of RIGHT shoulder pain.     This patient visit is a consult from the following provider: Bethany Arnold MD  Today's office visit notes will be made available to the consulting/refering provider via the mail, a fax, and/or an in basket message through the electronic medical record    HISTORY OF PRESENT ILLNESS  Hand dominance, right    Location:  anterior and lateral, right  Onset:  Insidious, early February 2017   Palliative:     Relative rest   Oral analgesics   Ice   fPT @ [facility], [facility], improved strength, lingering pain remains  Provocative:    Decreased pain with    ADLs  Yoga/aerial activities   Prior:  none  Progression:  Slowly resolving discomfort  Quality:    Sharp with activity  Radiation:  none  Severity:  per nursing documentation  Timing:  intermittent with use  Trauma:  None known    Review of systems (ROS):  A 10+ review of systems was performed with pertinent positives and negatives noted above in the history of present illness. Other systems were negative unless otherwise specified.    PHYSICAL EXAMINATION  General:  The patient is alert and oriented x 3. Mood is pleasant. Observation of ears, eyes and nose reveal no gross abnormalities. HEENT: NCAT, sclera anicteric. Lungs: Respirations are equal and unlabored.     RIGHT SHOULDER EXAMINATION     Observation:   Swelling:  none   Deformity:  none  Discoloration: none     Scapular: winging none  Scars: none     Atrophy:  none     Tenderness / Crepitus (T/C):   T/C         T/C  Clavicle    -/-   Supraspinatus    -/-    AC Jt.     -/-   Infraspinatus   -/-   SC Jt.      -/-   Deltoid      -/-     G. Tuberosity    -/-   LH Bicep groove  -/-  Acromion:   -/-   Midline Neck     -/-    Scapular Spine  -/-   Trapezium    -/-  SMA Scapula   -/-   GH jt. line - post   -/-    Scapulothoracic  -/-     ROM: (* = with pain)     Left shoulder    Right shoulder   AROM (PROM)    AROM  (PROM)  FE       170° (175°)      170° (175°)*   ER at 0°      60° (65°)      90° (90°)*  ER at 90° ABD   90° (90°)      90° (90°)*  IR at 90° ABD    NA (40°)      NA (40°)*    IR (spine level)    T10       T10       Strength: (* = with pain)   Left shoulder    Right shoulder  SCAPTION    5/5      4/5*   IR      5/5       5/5  ER      5/5       5/5  BICEPS    5/5       5/5  Deltoid     5/5       5/5       Signs: Painful side      NEER     -       OOBDULIAS    +   DIEGO     +       SPEEDS    +    DROP ARM     -     BELLY PRESS  -  Superior escape  none      LIFT-OFF   +  X-Body ADD      +       MOVING VALGUS  -         Stability Testing:    Left shoulder    Right shoulder   Translation    Anterior   up face       up face  Posterior    up face      up face  Sulcus     < 10mm     < 10 mm     Signs  Apprehension    neg        neg      Relocation     no change      no change     Jerk test    neg        neg     Extremity Neuro-vascular Testing:  Sensation grossly intact to light touch all dermatomal regions.   DTR 2+ Biceps, Triceps, BR and Negative Alfreds sign  Grossly intact motor function at Elbow, Wrist and Hand  Distal pulses radial and ulnar 2+, brisk cap refill, symmetric.       NECK: Painless FROM and spinous processes non-tender. Negative Spurlings sign.      Other Findings:    ASSESSMENT & PLAN   Assessment:   #1 inflammatory changes of superior glenoid labrum, right    No evidence of osseous pathology  No evidence of neurologic pathology  No evidence of vascular pathology    Imaging studies reviewed:   X-ray shoulder, right 17.02  MRIa shoulder, right 17.04    Plan:  We discussed options including:  #1 watchful waiting  #2 re start physical therapy aimed at:   RoM glenohumeral joint   Strengthening rotator cuff   Scapular stability  #3 injection therapy:   CSI SAB    Right,     Left,    CSI iaGH    Right,     Left,    orthobiologics   #4 consultation      The patient chooses #2 and #3 csi sab  right    Pain management: handout given, #3 = meloxicam (prior)  Bracing:   Physical therapy:    fPT, @ [facility], re start as above ([facility])  Activity (e.g. sports, work) restrictions: as tolerated   school/vocation: yoga, aerial yoga, undergrad at [employer]     Follow up in 2 w  A/e csi sab right   Ineffective-->#4  Should symptoms worsen or fail to resolve, consider:  Revisiting the above options

## 2017-05-03 ENCOUNTER — OFFICE VISIT (OUTPATIENT)
Dept: OBSTETRICS AND GYNECOLOGY | Facility: CLINIC | Age: 27
End: 2017-05-03
Payer: COMMERCIAL

## 2017-05-03 VITALS
DIASTOLIC BLOOD PRESSURE: 70 MMHG | WEIGHT: 147.69 LBS | HEIGHT: 67 IN | SYSTOLIC BLOOD PRESSURE: 110 MMHG | BODY MASS INDEX: 23.18 KG/M2

## 2017-05-03 DIAGNOSIS — Z01.419 WELL WOMAN EXAM WITH ROUTINE GYNECOLOGICAL EXAM: Primary | ICD-10-CM

## 2017-05-03 DIAGNOSIS — Z87.42 HISTORY OF ABNORMAL CERVICAL PAP SMEAR: ICD-10-CM

## 2017-05-03 DIAGNOSIS — Z97.5 IUD (INTRAUTERINE DEVICE) IN PLACE: ICD-10-CM

## 2017-05-03 DIAGNOSIS — Z01.419 PAP SMEAR, AS PART OF ROUTINE GYNECOLOGICAL EXAMINATION: ICD-10-CM

## 2017-05-03 PROCEDURE — 99395 PREV VISIT EST AGE 18-39: CPT | Mod: S$GLB,,, | Performed by: NURSE PRACTITIONER

## 2017-05-03 PROCEDURE — 87624 HPV HI-RISK TYP POOLED RSLT: CPT

## 2017-05-03 PROCEDURE — 88175 CYTOPATH C/V AUTO FLUID REDO: CPT | Performed by: PATHOLOGY

## 2017-05-03 PROCEDURE — 88141 CYTOPATH C/V INTERPRET: CPT | Mod: ,,, | Performed by: PATHOLOGY

## 2017-05-03 PROCEDURE — 99999 PR PBB SHADOW E&M-EST. PATIENT-LVL III: CPT | Mod: PBBFAC,,, | Performed by: NURSE PRACTITIONER

## 2017-05-03 NOTE — PROGRESS NOTES
CC: Annual  HPI: Pt is a 27 y.o.  female who presents for routine annual exam. She uses Mirena for contraception. She does not want STD screening. She does not have a cycle with her IUD. She had the IUD inserted in March 2015. She does not try to feel the strings. No family h/o breast cancer. No problems today.     Last pap-ASCUS with + HPV 3/2016 (colpo was done; needs repeat pap and co-testing today)      ROS:  GENERAL: Feeling well overall. Denies fever or chills.   SKIN: Denies rash or lesions.   HEAD: Denies head injury or headache.   NODES: Denies enlarged lymph nodes.   CHEST: Denies chest pain or shortness of breath.   CARDIOVASCULAR: Denies palpitations or left sided chest pain.   ABDOMEN: No abdominal pain, constipation, diarrhea, nausea, vomiting or rectal bleeding.   URINARY: No dysuria, hematuria, or burning on urination.  REPRODUCTIVE: See HPI.   BREASTS: Denies pain, lumps, or nipple discharge.   HEMATOLOGIC: No easy bruisability or excessive bleeding.   MUSCULOSKELETAL: Denies joint pain or swelling.   NEUROLOGIC: Denies syncope or weakness.   PSYCHIATRIC: Denies depression, anxiety or mood swings.    PE:   APPEARANCE: Well nourished, well developed, Black or  female in no acute distress.  NODES: no cervical, supraclavicular, or inguinal lymphadenopathy  BREASTS: Symmetrical, no skin changes or visible lesions. No palpable masses, nipple discharge or adenopathy bilaterally.  ABDOMEN: Soft. No tenderness or masses. No distention. No hernias palpated. No CVA tenderness.  VULVA: No lesions. Normal external female genitalia.  URETHRAL MEATUS: Normal size and location, no lesions, no prolapse.  URETHRA: No masses, tenderness, or prolapse.  VAGINA: Moist. No lesions or lacerations noted. No abnormal discharge present. No odor present.   CERVIX: No lesions or discharge. No cervical motion tenderness. IUD strings visualized-2 cm at os  UTERUS: Normal size, regular shape, mobile,  non-tender.  ADNEXA: No tenderness. No fullness or masses palpated in the adnexal regions.   ANUS PERINEUM: Normal.      Diagnosis:  1. Well woman exam with routine gynecological exam    2. Pap smear, as part of routine gynecological examination    3. History of abnormal cervical Pap smear    4. IUD (intrauterine device) in place-Mirena        Plan:     Orders Placed This Encounter    HPV High Risk Genotypes, PCR    Liquid-based pap smear, screening     -pap with co-testing done    Patient was counseled today on the new ACS guidelines for cervical cytology screening as well as the current recommendations for breast cancer screening. She was counseled to follow up with her PCP for other routine health maintenance. Counseling session lasted approximately 10 minutes, and all her questions were answered.    Follow-up with me in 1 year for routine exam; pap in 3 years.

## 2017-05-03 NOTE — MR AVS SNAPSHOT
"    [facility] [address]  [address]  [place] [address]  Phone: [phone]  Fax: [phone]                  Am Scot Whitehead   5/3/2017 3:00 PM   Office Visit    Description:  Female : 1990   Provider:  Liliana Campa NP   Department:  [facility] [address]           Reason for Visit     Gynecologic Exam                To Do List           Future Appointments        Provider Department Dept Phone    5/15/2017 1:15 PM Carrillo Vasquez MD [facility] [phone]      Goals (5 Years of Data)     None      [facility]     [facility] Nurse Care Line -  Assistance  Unless otherwise directed by your provider, please contact [facility], our nurse care line that is available for  assistance.     Registered nurses in the [facility] Center provide: appointment scheduling, clinical advisement, health education, and other advisory services.  Call: [phone] (toll free)               Medications           Message regarding Medications     Verify the changes and/or additions to your medication regime listed below are the same as discussed with your clinician today.  If any of these changes or additions are incorrect, please notify your healthcare provider.             Verify that the below list of medications is an accurate representation of the medications you are currently taking.  If none reported, the list may be blank. If incorrect, please contact your healthcare provider. Carry this list with you in case of emergency.           Current Medications     fluticasone (FLONASE) 50 mcg/actuation nasal spray 1 spray by Each Nare route once daily.    meloxicam (MOBIC) 7.5 MG tablet Take 1 tablet (7.5 mg total) by mouth once daily.           Clinical Reference Information           Your Vitals Were     BP Height Weight BMI       110/70 5' 7" (1.702 m) 67 kg (147 lb 11.3 oz) 23.13 kg/m2       Blood Pressure          Most Recent " Value    BP  110/70      Allergies as of 5/3/2017     No Known Allergies      Immunizations Administered on Date of Encounter - 5/3/2017     None      Language Assistance Services     ATTENTION: Language assistance services are available, free of charge. Please call [phone].      ATENCIÓN: Si jensla bony, tiene a sena disposición servicios gratuitos de asistencia lingüística. Llame al [phone].     CHÚ Ý: N?u b?n nói Ti?ng Vi?t, có các d?ch v? h? tr? ngôn ng? mi?n phí dành cho b?n. G?i s? [phone].         [facility] [address] complies with applicable Federal civil rights laws and does not discriminate on the basis of race, color, national origin, age, disability, or sex.

## 2017-05-09 PROBLEM — R87.810 ASCUS WITH POSITIVE HIGH RISK HPV CERVICAL: Status: ACTIVE | Noted: 2017-05-09

## 2017-05-09 PROBLEM — R87.610 ASCUS WITH POSITIVE HIGH RISK HPV CERVICAL: Status: ACTIVE | Noted: 2017-05-09

## 2017-05-09 LAB
HPV16 DNA SPEC QL NAA+PROBE: NEGATIVE
HPV16+18+H RISK 12 DNA CVX-IMP: POSITIVE
HPV18 DNA SPEC QL NAA+PROBE: NEGATIVE

## 2017-05-10 ENCOUNTER — PATIENT MESSAGE (OUTPATIENT)
Dept: OBSTETRICS AND GYNECOLOGY | Facility: CLINIC | Age: 27
End: 2017-05-10

## 2017-05-30 ENCOUNTER — PATIENT MESSAGE (OUTPATIENT)
Dept: OBSTETRICS AND GYNECOLOGY | Facility: CLINIC | Age: 27
End: 2017-05-30

## 2017-06-14 ENCOUNTER — PROCEDURE VISIT (OUTPATIENT)
Dept: OBSTETRICS AND GYNECOLOGY | Facility: CLINIC | Age: 27
End: 2017-06-14
Attending: OBSTETRICS & GYNECOLOGY
Payer: COMMERCIAL

## 2017-06-14 ENCOUNTER — PATIENT MESSAGE (OUTPATIENT)
Dept: OBSTETRICS AND GYNECOLOGY | Facility: CLINIC | Age: 27
End: 2017-06-14

## 2017-06-14 VITALS
HEIGHT: 66 IN | DIASTOLIC BLOOD PRESSURE: 72 MMHG | BODY MASS INDEX: 23.13 KG/M2 | SYSTOLIC BLOOD PRESSURE: 122 MMHG | WEIGHT: 143.94 LBS

## 2017-06-14 DIAGNOSIS — R87.810 ASCUS WITH POSITIVE HIGH RISK HPV CERVICAL: Primary | ICD-10-CM

## 2017-06-14 DIAGNOSIS — R87.610 ASCUS WITH POSITIVE HIGH RISK HPV CERVICAL: Primary | ICD-10-CM

## 2017-06-14 LAB
B-HCG UR QL: NEGATIVE
CTP QC/QA: YES

## 2017-06-14 PROCEDURE — 81025 URINE PREGNANCY TEST: CPT | Mod: QW,S$GLB,, | Performed by: OBSTETRICS & GYNECOLOGY

## 2017-06-14 PROCEDURE — 57454 BX/CURETT OF CERVIX W/SCOPE: CPT | Mod: S$GLB,,, | Performed by: OBSTETRICS & GYNECOLOGY

## 2017-06-14 PROCEDURE — 88305 TISSUE EXAM BY PATHOLOGIST: CPT | Mod: 26,,, | Performed by: PATHOLOGY

## 2017-06-14 PROCEDURE — 88342 IMHCHEM/IMCYTCHM 1ST ANTB: CPT | Mod: 26,,, | Performed by: PATHOLOGY

## 2017-06-14 PROCEDURE — 88305 TISSUE EXAM BY PATHOLOGIST: CPT | Mod: 59 | Performed by: PATHOLOGY

## 2017-06-14 NOTE — PROCEDURES
Procedures         Procedure Note        COLPOSCOPY    Am Scot Erinn Whitehead is a 27 y.o. female   presents for colposcopy.  No LMP recorded (lmp unknown). Patient is not currently having periods (Reason: Birth Control)..  Her most recent pap smear shows ASCUS with POSITIVE high risk HPV.      The patients's abnormal pap smear results were reviewed and  findings were discussed.  The natural history of HPV infection, need for colposcopy and possible biopsies to determine the plan of care, treatments available were discussed.  Minimal risk of bleeding/ infection with colposcopy was discussed.  Patient voiced understanding and agreed to proceed with colposcopy with possible biopsy.      UPT is negative    COLPOSCOPY EXAM:   TIME OUT PERFORMED.     acetowhite lesion(s) noted at 10 o'clock, IUD strings visible at external cervical os    Biopsy was taken at 10 o'clock.  ECC was performed    Hemostasis was adequate with pressure/ application of Monsel's solution.  The speculum was removed.  The patient did tolerate the procedure well.    All collected specimens sent to pathology for histologic analysis.    Post-colposcopy counseling:  The patient was instructed to manage post-colposcopy cramping with NSAIDs or Tylenol, or with a prescription per the medication card.  Avoid intercourse, douching, or tampons in the vagina for at least 2-3 days.  Additionally the patient was told to expect a clumpy blackish discharge due to Monsel's solution application for several days.  Patient was instructed to report heavy bleeding, worsening pain/  pain that does not respond to above medications, or foul-smelling vaginal discharge. HPV vaccine recommended according to FDA age guidelines.  Importance of follow-up stressed.      Follow up based on colposcopy results.    Syeda Silva DO  [phone]

## 2017-07-17 ENCOUNTER — PATIENT MESSAGE (OUTPATIENT)
Dept: OBSTETRICS AND GYNECOLOGY | Facility: CLINIC | Age: 27
End: 2017-07-17

## 2017-07-19 ENCOUNTER — TELEPHONE (OUTPATIENT)
Dept: OBSTETRICS AND GYNECOLOGY | Facility: CLINIC | Age: 27
End: 2017-07-19

## 2017-08-03 ENCOUNTER — PATIENT MESSAGE (OUTPATIENT)
Dept: OBSTETRICS AND GYNECOLOGY | Facility: CLINIC | Age: 27
End: 2017-08-03

## 2018-03-14 ENCOUNTER — PATIENT MESSAGE (OUTPATIENT)
Dept: OBSTETRICS AND GYNECOLOGY | Facility: CLINIC | Age: 28
End: 2018-03-14

## 2018-03-29 ENCOUNTER — PATIENT OUTREACH (OUTPATIENT)
Dept: INTERNAL MEDICINE | Facility: CLINIC | Age: 28
End: 2018-03-29

## 2018-03-29 NOTE — PROGRESS NOTES
[facility] is committed to your overall health.  To help you get the most out of each of your visits, we will review your information to make sure you are up to date on all of your recommended tests and/or procedures.       Your PCP  Bethany Arnold MD   found that you may be due for:       Health Maintenance Due   Topic Date Due    TETANUS VACCINE  03/09/2006    Influenza Vaccine  08/01/2017             If you have had any of the above done at another facility, please bring the records or information with you so that your record at [facility] will be complete.  If you would like to schedule any of these, please contact me.     If you are currently taking medication, please bring it with you to your appointment for review.     Also, if you have any type of Advanced Directives, please bring them with you to your office visit so we may scan them into your chart.       Thank you for Choosing [facility] for your healthcare needs.        Additional Information  If you have questions, you can email [email or web address] or call [phone]  to talk to our [facility] staff. Remember, [facility] is NOT to be used for urgent needs. For medical emergencies, dial 911.

## 2018-04-02 ENCOUNTER — OFFICE VISIT (OUTPATIENT)
Dept: FAMILY MEDICINE | Facility: CLINIC | Age: 28
End: 2018-04-02
Attending: FAMILY MEDICINE
Payer: COMMERCIAL

## 2018-04-02 ENCOUNTER — LAB VISIT (OUTPATIENT)
Dept: LAB | Facility: HOSPITAL | Age: 28
End: 2018-04-02
Attending: FAMILY MEDICINE
Payer: COMMERCIAL

## 2018-04-02 VITALS
HEART RATE: 68 BPM | DIASTOLIC BLOOD PRESSURE: 60 MMHG | SYSTOLIC BLOOD PRESSURE: 110 MMHG | BODY MASS INDEX: 23.7 KG/M2 | HEIGHT: 66 IN | WEIGHT: 147.5 LBS | OXYGEN SATURATION: 99 %

## 2018-04-02 DIAGNOSIS — R87.610 ASCUS WITH POSITIVE HIGH RISK HPV CERVICAL: ICD-10-CM

## 2018-04-02 DIAGNOSIS — E55.9 VITAMIN D DEFICIENCY: ICD-10-CM

## 2018-04-02 DIAGNOSIS — J31.0 CHRONIC NONALLERGIC RHINITIS: ICD-10-CM

## 2018-04-02 DIAGNOSIS — Z00.00 ROUTINE GENERAL MEDICAL EXAMINATION AT A HEALTH CARE FACILITY: Primary | ICD-10-CM

## 2018-04-02 DIAGNOSIS — R87.810 ASCUS WITH POSITIVE HIGH RISK HPV CERVICAL: ICD-10-CM

## 2018-04-02 DIAGNOSIS — Z97.5 IUD (INTRAUTERINE DEVICE) IN PLACE: ICD-10-CM

## 2018-04-02 LAB — 25(OH)D3+25(OH)D2 SERPL-MCNC: 12 NG/ML

## 2018-04-02 PROCEDURE — 99999 PR PBB SHADOW E&M-EST. PATIENT-LVL III: CPT | Mod: PBBFAC,,, | Performed by: FAMILY MEDICINE

## 2018-04-02 PROCEDURE — 99395 PREV VISIT EST AGE 18-39: CPT | Mod: 25,S$GLB,, | Performed by: FAMILY MEDICINE

## 2018-04-02 PROCEDURE — 82306 VITAMIN D 25 HYDROXY: CPT

## 2018-04-02 PROCEDURE — 90715 TDAP VACCINE 7 YRS/> IM: CPT | Mod: S$GLB,,, | Performed by: FAMILY MEDICINE

## 2018-04-02 PROCEDURE — 36415 COLL VENOUS BLD VENIPUNCTURE: CPT | Mod: PO

## 2018-04-02 PROCEDURE — 90471 IMMUNIZATION ADMIN: CPT | Mod: S$GLB,,, | Performed by: FAMILY MEDICINE

## 2018-04-02 RX ORDER — MINERAL OIL
180 ENEMA (ML) RECTAL DAILY
Qty: 30 TABLET | Refills: 2 | Status: SHIPPED | OUTPATIENT
Start: 2018-04-02 | End: 2019-06-26

## 2018-04-02 NOTE — PATIENT INSTRUCTIONS
Your test results will be communicated to you via : [facility], Telephone or Letter.   If you have not received your test results in one week, please contact the clinic at [phone].

## 2018-04-02 NOTE — PROGRESS NOTES
Subjective:       Patient ID: Coby Whitehead is a 28 y.o. female.    Chief Complaint: Annual Exam    HPI    The patient presents to the office today requesting a routine periodic health examination.  This is her first visit with me.  She is a former patient of Dr. Perez.      Patient Active Problem List   Diagnosis    Chronic nonallergic rhinitis    IUD in place-Mirena expires March 2020    ASCUS with positive high risk HPV cervical-needs colpo (May 2017)    Vitamin D deficiency       Past Surgical History:   Procedure Laterality Date    COLPOSCOPY  06/14/2017    KNEE ARTHROSCOPY W/ ACL RECONSTRUCTION Left     KNEE ARTHROSCOPY W/ MENISCAL REPAIR Right     NASAL POLYP EXCISION      w/septoplasty and sinus ostiotomy         Current Outpatient Prescriptions:     fluticasone (FLONASE) 50 mcg/actuation nasal spray, 1 spray by Each Nare route once daily., Disp: 16 g, Rfl: 11    levonorgestrel (MIRENA) 20 mcg/24 hr (5 years) IUD, 1 each by Intrauterine route once., Disp: , Rfl:     fexofenadine (ALLEGRA) 180 MG tablet, Take 1 tablet (180 mg total) by mouth once daily., Disp: 30 tablet, Rfl: 2    Review of patient's allergies indicates:  No Known Allergies    Family History   Problem Relation Age of Onset    Hypertension Mother     No Known Problems Father     No Known Problems Brother        Social History     Social History    Marital status: Single     Spouse name: N/A    Number of children: N/A    Years of education: N/A     Occupational History    Not on file.     Social History Main Topics    Smoking status: Never Smoker    Smokeless tobacco: Never Used    Alcohol use 1.2 oz/week     2 Standard drinks or equivalent per week    Drug use: No    Sexual activity: Not Currently     Birth control/ protection: IUD     Other Topics Concern    Not on file     Social History Narrative    The patient does exercise regularly (6d/wk).      She is satisfied with weight.    Rates diet as good.     She does not drink at least 1/2 gallon water daily.    She drinks 2 coffee/tea/caffeine-containing soft drinks daily.    Total sleep time at night is 6-7 hours.    She works 70 hours per week.    She does wear seat belts.    Hobbies include sewing.           OB History      Para Term  AB Living    0 0 0 0 0 0    SAB TAB Ectopic Multiple Live Births    0 0 0 0          Obstetric Comments    Menarche age 11.   Menses nearly absent on IUD.  History of abnormal PAP smear: YES.  History of sexually transmitted disease:  YES: HPV.               Patient Care Team:  Elbert Alegria Jr., MD as PCP - General (Family Medicine)  Syeda Silva DO as Obstetrician (Obstetrics and Gynecology)  Carrillo Vasquez MD as Consulting Physician (Sports Medicine)    Review of Systems   Constitutional: Negative for activity change, fatigue and unexpected weight change.   HENT: Positive for rhinorrhea. Negative for ear discharge, ear pain, hearing loss, tinnitus, trouble swallowing and voice change.    Eyes: Negative for discharge and visual disturbance.   Respiratory: Negative for cough, chest tightness, shortness of breath and wheezing.    Cardiovascular: Negative for chest pain, palpitations and leg swelling.   Gastrointestinal: Negative for abdominal pain, blood in stool, constipation, diarrhea, nausea and vomiting.   Endocrine: Negative for polydipsia and polyuria.   Genitourinary: Negative for difficulty urinating, dyspareunia, dysuria, frequency, hematuria and menstrual problem.   Musculoskeletal: Positive for arthralgias and joint swelling. Negative for back pain, myalgias and neck pain.   Skin: Negative for rash.   Neurological: Negative for dizziness, weakness, light-headedness and headaches.   Hematological: Does not bruise/bleed easily.   Psychiatric/Behavioral: Negative for confusion, dysphoric mood and sleep disturbance. The patient is not nervous/anxious.        Objective:      Physical Exam  "  Constitutional: She is oriented to person, place, and time. She appears well-developed and well-nourished. She is cooperative.   HENT:   Head: Normocephalic and atraumatic.   Nose: Nose normal.   Mouth/Throat: Oropharynx is clear and moist and mucous membranes are normal.   Eyes: Conjunctivae are normal. No scleral icterus.   Neck: Neck supple. No JVD present. Carotid bruit is not present. No thyromegaly present.   Cardiovascular: Normal rate, regular rhythm, normal heart sounds and normal pulses.  Exam reveals no gallop and no friction rub.    No murmur heard.  Pulmonary/Chest: Effort normal and breath sounds normal. She has no wheezes. She has no rhonchi. She has no rales.   Abdominal: Soft. Bowel sounds are normal. She exhibits no distension and no mass. There is no splenomegaly or hepatomegaly. There is no tenderness.   Musculoskeletal: Normal range of motion. She exhibits no edema or tenderness.   Lymphadenopathy:     She has no cervical adenopathy.     She has no axillary adenopathy.   Neurological: She is alert and oriented to person, place, and time. She has normal strength and normal reflexes. No cranial nerve deficit or sensory deficit.   Skin: Skin is warm and dry.   Psychiatric: She has a normal mood and affect. Her speech is normal.   Vitals reviewed.        Assessment:       1. Routine general medical examination at a health care facility    2. Chronic nonallergic rhinitis    3. ASCUS with positive high risk HPV cervical-needs colpo (May 2017)    4. IUD in place-Mirena expires March 2020    5. Vitamin D deficiency          Plan:       No labs needed.  Continue healthy lifestyle.  Discussed routine examinations and screenings.   TdaP today.  Discussed with patient the importance of lifestyle modifications, including well-balanced diet and moderate exercise regimen, in reducing risk for cardiovascular/cerebrovascular disease and diabetes.  RTC 2-3 years.      "This note will not be shared with the " "patient."  "

## 2018-04-04 ENCOUNTER — PATIENT MESSAGE (OUTPATIENT)
Dept: FAMILY MEDICINE | Facility: CLINIC | Age: 28
End: 2018-04-04

## 2018-04-04 DIAGNOSIS — E55.9 VITAMIN D DEFICIENCY: Primary | ICD-10-CM

## 2018-04-05 RX ORDER — VIT C/E/ZN/COPPR/LUTEIN/ZEAXAN 250MG-90MG
1000 CAPSULE ORAL DAILY
Refills: 0 | COMMUNITY
Start: 2018-04-05

## 2018-04-05 RX ORDER — ERGOCALCIFEROL 1.25 MG/1
50000 CAPSULE ORAL WEEKLY
Qty: 4 CAPSULE | Refills: 2 | Status: SHIPPED | OUTPATIENT
Start: 2018-04-05 | End: 2018-04-09 | Stop reason: SDUPTHER

## 2018-04-09 ENCOUNTER — PATIENT MESSAGE (OUTPATIENT)
Dept: FAMILY MEDICINE | Facility: CLINIC | Age: 28
End: 2018-04-09

## 2018-04-09 DIAGNOSIS — E55.9 VITAMIN D DEFICIENCY: Primary | ICD-10-CM

## 2018-04-09 RX ORDER — ERGOCALCIFEROL 1.25 MG/1
50000 CAPSULE ORAL WEEKLY
Qty: 4 CAPSULE | Refills: 2 | Status: SHIPPED | OUTPATIENT
Start: 2018-04-09 | End: 2018-07-02 | Stop reason: SDUPTHER

## 2018-04-17 ENCOUNTER — OFFICE VISIT (OUTPATIENT)
Dept: OBSTETRICS AND GYNECOLOGY | Facility: CLINIC | Age: 28
End: 2018-04-17
Attending: OBSTETRICS & GYNECOLOGY
Payer: COMMERCIAL

## 2018-04-17 VITALS
WEIGHT: 150.81 LBS | SYSTOLIC BLOOD PRESSURE: 120 MMHG | DIASTOLIC BLOOD PRESSURE: 57 MMHG | BODY MASS INDEX: 24.24 KG/M2 | HEIGHT: 66 IN

## 2018-04-17 DIAGNOSIS — Z97.5 IUD (INTRAUTERINE DEVICE) IN PLACE: ICD-10-CM

## 2018-04-17 DIAGNOSIS — R87.610 ASCUS WITH POSITIVE HIGH RISK HPV CERVICAL: ICD-10-CM

## 2018-04-17 DIAGNOSIS — R87.810 ASCUS WITH POSITIVE HIGH RISK HPV CERVICAL: ICD-10-CM

## 2018-04-17 DIAGNOSIS — Z01.419 WELL WOMAN EXAM WITH ROUTINE GYNECOLOGICAL EXAM: Primary | ICD-10-CM

## 2018-04-17 PROCEDURE — 99395 PREV VISIT EST AGE 18-39: CPT | Mod: S$GLB,,, | Performed by: OBSTETRICS & GYNECOLOGY

## 2018-04-17 PROCEDURE — 88175 CYTOPATH C/V AUTO FLUID REDO: CPT | Performed by: PATHOLOGY

## 2018-04-17 PROCEDURE — 87624 HPV HI-RISK TYP POOLED RSLT: CPT

## 2018-04-17 PROCEDURE — 88141 CYTOPATH C/V INTERPRET: CPT | Mod: ,,, | Performed by: PATHOLOGY

## 2018-04-17 NOTE — PROGRESS NOTES
CC: Well woman exam    [facility] Erinn Whitehead is a 28 y.o. female  presents for well woman exam.  LMP: No LMP recorded. Patient is not currently having periods (Reason: Birth Control)..  No issues, problems, or complaints.  Spots occasionally with mirena, checks strings and has noted any problems.  History of ASCUS/ non-16/18 positive pap in , DANIELA I colpo on 2017.  Returns for f/u.  Not currently sexually active.  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      Past Medical History:   Diagnosis Date    ASCUS with positive high risk HPV cervical- 2016    colposcopy    IUD (intrauterine device) in place     Mirena    Rhinitis     nonallergic/seasonal      Past Surgical History:   Procedure Laterality Date    COLPOSCOPY  2017    KNEE ARTHROSCOPY W/ ACL RECONSTRUCTION Left     KNEE ARTHROSCOPY W/ MENISCAL REPAIR Right     NASAL POLYP EXCISION      w/septoplasty and sinus ostiotomy     Social History     Social History    Marital status: Single     Spouse name: N/A    Number of children: N/A    Years of education: N/A     Occupational History    Not on file.     Social History Main Topics    Smoking status: Never Smoker    Smokeless tobacco: Never Used    Alcohol use 1.2 oz/week     2 Standard drinks or equivalent per week    Drug use: No    Sexual activity: Not Currently     Birth control/ protection: IUD     Other Topics Concern    Not on file     Social History Narrative    The patient does exercise regularly (6d/wk).      She is satisfied with weight.    Rates diet as good.    She does not drink at least 1/2 gallon water daily.    She drinks 2 coffee/tea/caffeine-containing soft drinks daily.    Total sleep time at night is 6-7 hours.    She works 70 hours per week.    She does wear seat belts.    Hobbies include sewing.         Family History   Problem Relation Age of Onset    Hypertension Mother     No Known  "Problems Father     No Known Problems Brother     Breast cancer Neg Hx     Ovarian cancer Neg Hx     Colon cancer Neg Hx      OB History      Para Term  AB Living    0 0 0 0 0 0    SAB TAB Ectopic Multiple Live Births    0 0 0 0          Obstetric Comments    Menarche age 11.   Menses nearly absent on IUD.  History of abnormal PAP smear: YES.  History of sexually transmitted disease:  YES: HPV.               BP (!) 120/57   Ht 5' 6" (1.676 m)   Wt 68.4 kg (150 lb 12.7 oz)   BMI 24.34 kg/m²       ROS:  GENERAL: Denies weight gain or weight loss. Feeling well overall.   SKIN: Denies rash or lesions.   HEAD: Denies head injury or headache.   NODES: Denies enlarged lymph nodes.   CHEST: Denies chest pain or shortness of breath.   CARDIOVASCULAR: Denies palpitations or left sided chest pain.   ABDOMEN: No abdominal pain, constipation, diarrhea, nausea, vomiting or rectal bleeding.   URINARY: No frequency, dysuria, hematuria, or burning on urination.  REPRODUCTIVE: See HPI.   BREASTS: The patient performs breast self-examination and denies pain, lumps, or nipple discharge.   HEMATOLOGIC: No easy bruisability or excessive bleeding.   MUSCULOSKELETAL: Denies joint pain or swelling.   NEUROLOGIC: Denies syncope or weakness.   PSYCHIATRIC: Denies depression, anxiety or mood swings.    PHYSICAL EXAM:  APPEARANCE: Well nourished, well developed, in no acute distress.  AFFECT: WNL, alert and oriented x 3  SKIN: No acne or hirsutism  NECK: Neck symmetric without masses or thyromegaly  NODES: No inguinal, cervical, axillary, or femoral lymph node enlargement  CHEST: Good respiratory effect  ABDOMEN: Soft.  No tenderness or masses.  No hepatosplenomegaly.  No hernias.  BREASTS: Symmetrical, no skin changes or visible lesions.  No palpable masses, nipple discharge bilaterally.  PELVIC: Normal external genitalia without lesions.  Normal hair distribution.  Adequate perineal body, normal urethral meatus.  Vagina " moist and well rugated without lesions or discharge.  Cervix pink, without lesions, discharge or tenderness, iud strings visible at external os, approximately 2.5cm long.  No significant cystocele or rectocele.  Bimanual exam shows uterus to be normal size, regular, mobile and nontender.  Adnexa without masses or tenderness.    EXTREMITIES: No edema.    Well woman exam with routine gynecological exam    IUD in place-Mirena expires March 2020    ASCUS with positive high risk HPV cervical-needs colpo (May 2017)  -     Liquid-based pap smear, screening  -     HPV High Risk Genotypes, PCR            Patient was counseled today on A.C.S. Pap guidelines and recommendations for yearly pelvic exams, mammograms and monthly self breast exams; to see her PCP for other health maintenance.     No Follow-up on file.

## 2018-04-20 LAB
HPV16 AG SPEC QL: NEGATIVE
HPV16+18+H RISK 12 DNA CVX-IMP: POSITIVE
HPV18 DNA SPEC QL NAA+PROBE: NEGATIVE

## 2018-05-01 ENCOUNTER — PATIENT MESSAGE (OUTPATIENT)
Dept: OBSTETRICS AND GYNECOLOGY | Facility: CLINIC | Age: 28
End: 2018-05-01

## 2018-05-01 NOTE — TELEPHONE ENCOUNTER
Erendira, this is Ivon. I've received your request I'm returning your call from the clinic at [facility]. I just wanted to schedule colposcopy.

## 2018-05-08 ENCOUNTER — PATIENT MESSAGE (OUTPATIENT)
Dept: OBSTETRICS AND GYNECOLOGY | Facility: CLINIC | Age: 28
End: 2018-05-08

## 2018-05-14 NOTE — TELEPHONE ENCOUNTER
Patient is requesting to not have the colpo done due to being too expensive and would like to know if it is medically necessary

## 2018-05-23 ENCOUNTER — PATIENT MESSAGE (OUTPATIENT)
Dept: OBSTETRICS AND GYNECOLOGY | Facility: CLINIC | Age: 28
End: 2018-05-23

## 2018-06-26 ENCOUNTER — OFFICE VISIT (OUTPATIENT)
Dept: OBSTETRICS AND GYNECOLOGY | Facility: CLINIC | Age: 28
End: 2018-06-26
Attending: OBSTETRICS & GYNECOLOGY
Payer: COMMERCIAL

## 2018-06-26 VITALS
SYSTOLIC BLOOD PRESSURE: 116 MMHG | DIASTOLIC BLOOD PRESSURE: 72 MMHG | HEIGHT: 66 IN | BODY MASS INDEX: 24.03 KG/M2 | WEIGHT: 149.5 LBS

## 2018-06-26 DIAGNOSIS — R87.612 LOW GRADE SQUAMOUS INTRAEPITHELIAL LESION (LGSIL) ON CERVICAL PAP SMEAR: Primary | ICD-10-CM

## 2018-06-26 DIAGNOSIS — R58 BLEEDING: ICD-10-CM

## 2018-06-26 LAB
B-HCG UR QL: NEGATIVE
CTP QC/QA: YES

## 2018-06-26 PROCEDURE — 88305 TISSUE EXAM BY PATHOLOGIST: CPT | Mod: 26,,, | Performed by: PATHOLOGY

## 2018-06-26 PROCEDURE — 57454 BX/CURETT OF CERVIX W/SCOPE: CPT | Mod: S$GLB,,, | Performed by: OBSTETRICS & GYNECOLOGY

## 2018-06-26 PROCEDURE — 99499 UNLISTED E&M SERVICE: CPT | Mod: S$GLB,,, | Performed by: OBSTETRICS & GYNECOLOGY

## 2018-06-26 PROCEDURE — 81025 URINE PREGNANCY TEST: CPT | Mod: S$GLB,,, | Performed by: OBSTETRICS & GYNECOLOGY

## 2018-06-26 PROCEDURE — 88305 TISSUE EXAM BY PATHOLOGIST: CPT | Mod: 59 | Performed by: PATHOLOGY

## 2018-06-26 NOTE — PROGRESS NOTES
Procedure Note        COLPOSCOPY    [facility] Erinn Whitehead is a 28 y.o. female   presents for colposcopy.  Patient's last menstrual period was 2018..  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      The patients's abnormal pap smear results were reviewed and  findings were discussed.  The natural history of HPV infection, need for colposcopy and possible biopsies to determine the plan of care, treatments available were discussed.  Minimal risk of bleeding/ infection with colposcopy was discussed.  Patient voiced understanding and agreed to proceed with colposcopy with possible biopsy.      UPT is negative    COLPOSCOPY EXAM:   TIME OUT PERFORMED.     acetowhite lesion(s) noted at 11 o'clock    Biopsy was taken at 11 o'clock.  ECC was performed    Hemostasis was adequate with pressure/ application of Monsel's solution.  The speculum was removed.  The patient did tolerate the procedure well.    All collected specimens sent to pathology for histologic analysis.    Post-colposcopy counseling:  The patient was instructed to manage post-colposcopy cramping with NSAIDs or Tylenol, or with a prescription per the medication card.  Avoid intercourse, douching, or tampons in the vagina for at least 2-3 days.  Additionally the patient was told to expect a clumpy blackish discharge due to Monsel's solution application for several days.  Patient was instructed to report heavy bleeding, worsening pain/  pain that does not respond to above medications, or foul-smelling vaginal discharge. HPV vaccine recommended according to FDA age guidelines.  Importance of follow-up stressed.      Follow up based on colposcopy results.    Syeda Silva DO  [phone]

## 2018-06-29 ENCOUNTER — PATIENT MESSAGE (OUTPATIENT)
Dept: OBSTETRICS AND GYNECOLOGY | Facility: HOSPITAL | Age: 28
End: 2018-06-29

## 2018-07-03 RX ORDER — ERGOCALCIFEROL 1.25 MG/1
50000 CAPSULE ORAL WEEKLY
Qty: 4 CAPSULE | Refills: 0 | Status: SHIPPED | OUTPATIENT
Start: 2018-07-03 | End: 2018-10-01

## 2019-04-23 ENCOUNTER — LAB VISIT (OUTPATIENT)
Dept: LAB | Facility: OTHER | Age: 29
End: 2019-04-23
Attending: OBSTETRICS & GYNECOLOGY
Payer: COMMERCIAL

## 2019-04-23 ENCOUNTER — OFFICE VISIT (OUTPATIENT)
Dept: OBSTETRICS AND GYNECOLOGY | Facility: CLINIC | Age: 29
End: 2019-04-23
Attending: OBSTETRICS & GYNECOLOGY
Payer: COMMERCIAL

## 2019-04-23 VITALS
WEIGHT: 150.13 LBS | DIASTOLIC BLOOD PRESSURE: 70 MMHG | BODY MASS INDEX: 24.13 KG/M2 | SYSTOLIC BLOOD PRESSURE: 98 MMHG | HEIGHT: 66 IN

## 2019-04-23 DIAGNOSIS — N63.20 LEFT BREAST MASS: ICD-10-CM

## 2019-04-23 DIAGNOSIS — Z11.3 SCREENING EXAMINATION FOR STD (SEXUALLY TRANSMITTED DISEASE): ICD-10-CM

## 2019-04-23 DIAGNOSIS — Z87.42 HISTORY OF ABNORMAL CERVICAL PAP SMEAR: Primary | ICD-10-CM

## 2019-04-23 PROCEDURE — 88175 CYTOPATH C/V AUTO FLUID REDO: CPT

## 2019-04-23 PROCEDURE — 36415 COLL VENOUS BLD VENIPUNCTURE: CPT

## 2019-04-23 PROCEDURE — 87491 CHLMYD TRACH DNA AMP PROBE: CPT

## 2019-04-23 PROCEDURE — 87624 HPV HI-RISK TYP POOLED RSLT: CPT

## 2019-04-23 PROCEDURE — 99999 PR PBB SHADOW E&M-EST. PATIENT-LVL III: ICD-10-PCS | Mod: PBBFAC,,, | Performed by: OBSTETRICS & GYNECOLOGY

## 2019-04-23 PROCEDURE — 86703 HIV-1/HIV-2 1 RESULT ANTBDY: CPT

## 2019-04-23 PROCEDURE — 99395 PREV VISIT EST AGE 18-39: CPT | Mod: S$GLB,,, | Performed by: OBSTETRICS & GYNECOLOGY

## 2019-04-23 PROCEDURE — 99999 PR PBB SHADOW E&M-EST. PATIENT-LVL III: CPT | Mod: PBBFAC,,, | Performed by: OBSTETRICS & GYNECOLOGY

## 2019-04-23 PROCEDURE — 80074 ACUTE HEPATITIS PANEL: CPT

## 2019-04-23 PROCEDURE — 99395 PR PREVENTIVE VISIT,EST,18-39: ICD-10-PCS | Mod: S$GLB,,, | Performed by: OBSTETRICS & GYNECOLOGY

## 2019-04-23 PROCEDURE — 86592 SYPHILIS TEST NON-TREP QUAL: CPT

## 2019-04-23 NOTE — PROGRESS NOTES
CC: Well woman exam    [facility] Erinn Whitehead is a 29 y.o. female  presents for well woman exam.  LMP: No LMP recorded. (Menstrual status: Birth Control)..  No issues, problems, or complaints.  MIrena in place since age 25 (approx 4 years), reports only light spotting with this.  Would like STD screening today. Fh and PMH reviewed.  History of DANIELA I colpo last year, repeat pap to be done today.      Past Medical History:   Diagnosis Date    ASCUS with positive high risk HPV cervical- 2016    colposcopy    IUD (intrauterine device) in place     Mirena    Rhinitis     nonallergic/seasonal      Past Surgical History:   Procedure Laterality Date    COLPOSCOPY  2017    KNEE ARTHROSCOPY W/ ACL RECONSTRUCTION Left     KNEE ARTHROSCOPY W/ MENISCAL REPAIR Right     NASAL POLYP EXCISION      w/septoplasty and sinus ostiotomy    WISDOM TOOTH EXTRACTION       Social History     Socioeconomic History    Marital status: Single     Spouse name: Not on file    Number of children: Not on file    Years of education: Not on file    Highest education level: Not on file   Occupational History    Not on file   Social Needs    Financial resource strain: Not on file    Food insecurity:     Worry: Not on file     Inability: Not on file    Transportation needs:     Medical: Not on file     Non-medical: Not on file   Tobacco Use    Smoking status: Never Smoker    Smokeless tobacco: Never Used   Substance and Sexual Activity    Alcohol use: Yes     Alcohol/week: 1.2 oz     Types: 2 Standard drinks or equivalent per week    Drug use: No    Sexual activity: Not Currently     Birth control/protection: IUD   Lifestyle    Physical activity:     Days per week: Not on file     Minutes per session: Not on file    Stress: Not on file   Relationships    Social connections:     Talks on phone: Not on file     Gets together: Not on file     Attends Islam service: Not on file     Active member of  "club or organization: Not on file     Attends meetings of clubs or organizations: Not on file     Relationship status: Not on file   Other Topics Concern    Not on file   Social History Narrative    The patient does exercise regularly (6d/wk).      She is satisfied with weight.    Rates diet as good.    She does not drink at least 1/2 gallon water daily.    She drinks 2 coffee/tea/caffeine-containing soft drinks daily.    Total sleep time at night is 6-7 hours.    She works 70 hours per week.    She does wear seat belts.    Hobbies include sewing.    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     Family History   Problem Relation Age of Onset    Hypertension Mother     No Known Problems Father     No Known Problems Brother     Breast cancer Neg Hx     Ovarian cancer Neg Hx     Colon cancer Neg Hx      OB History        0    Para   0    Term   0       0    AB   0    Living   0       SAB   0    TAB   0    Ectopic   0    Multiple   0    Live Births               Obstetric Comments   Menarche age 11.   Menses nearly absent on IUD.  History of abnormal PAP smear: YES.  History of sexually transmitted disease:  YES: HPV.                  BP 98/70   Ht 5' 6" (1.676 m)   Wt 68.1 kg (150 lb 2.1 oz)   BMI 24.23 kg/m²       ROS:  GENERAL: Denies weight gain or weight loss. Feeling well overall.   SKIN: Denies rash or lesions.   HEAD: Denies head injury or headache.   NODES: Denies enlarged lymph nodes.   CHEST: Denies chest pain or shortness of breath.   CARDIOVASCULAR: Denies palpitations or left sided chest pain.   ABDOMEN: No abdominal pain, constipation, diarrhea, nausea, vomiting or rectal bleeding.   URINARY: No frequency, dysuria, hematuria, or burning on urination.  REPRODUCTIVE: See HPI.   BREASTS: The patient performs breast self-examination and denies pain, lumps, or nipple discharge.   HEMATOLOGIC: No easy " bruisability or excessive bleeding.   MUSCULOSKELETAL: Denies joint pain or swelling.   NEUROLOGIC: Denies syncope or weakness.   PSYCHIATRIC: Denies depression, anxiety or mood swings.    PHYSICAL EXAM:  APPEARANCE: Well nourished, well developed, in no acute distress.  AFFECT: WNL, alert and oriented x 3  SKIN: No acne or hirsutism  NECK: Neck symmetric without masses or thyromegaly  NODES: No inguinal, cervical, axillary, or femoral lymph node enlargement  CHEST: Good respiratory effect  ABDOMEN: Soft.  No tenderness or masses.  No hepatosplenomegaly.  No hernias.  BREASTS: Symmetrical, no skin changes or visible lesions.  Dense breast tissue.  Round mass behind nipple of left breast.  No nipple discharge bilaterally.  PELVIC: Normal external genitalia without lesions.  Normal hair distribution.  Adequate perineal body, normal urethral meatus.  Vagina moist and well rugated without lesions or discharge.  Cervix pink, without lesions, discharge or tenderness, IUD strings visible at the external os.  No significant cystocele or rectocele.  Bimanual exam shows uterus to be normal size, regular, mobile and nontender.  Adnexa without masses or tenderness.    EXTREMITIES: No edema.    History of abnormal cervical Pap smear  -     Liquid-based pap smear, screening  -     HPV High Risk Genotypes, PCR    Screening examination for STD (sexually transmitted disease)  -     RPR; Future; Expected date: 04/23/2019  -     HIV 1/2 Ag/Ab (4th Gen); Future; Expected date: 04/23/2019  -     C. trachomatis/N. gonorrhoeae by AMP DNA [facility]; Cervix  -     Hepatitis panel, acute; Future; Expected date: 04/23/2019    Left breast mass  -     US Breast Left Complete; Future; Expected date: 04/23/2019            Patient was counseled today on A.C.S. Pap guidelines and recommendations for yearly pelvic exams, mammograms and monthly self breast exams; to see her PCP for other health maintenance.     No follow-ups on file.

## 2019-04-24 LAB
C TRACH DNA SPEC QL NAA+PROBE: NOT DETECTED
HAV IGM SERPL QL IA: NEGATIVE
HBV CORE IGM SERPL QL IA: NEGATIVE
HBV SURFACE AG SERPL QL IA: NEGATIVE
HCV AB SERPL QL IA: NEGATIVE
HIV 1+2 AB+HIV1 P24 AG SERPL QL IA: NEGATIVE
N GONORRHOEA DNA SPEC QL NAA+PROBE: NOT DETECTED
RPR SER QL: NORMAL

## 2019-04-26 LAB
HPV HR 12 DNA CVX QL NAA+PROBE: POSITIVE
HPV16 AG SPEC QL: NEGATIVE
HPV18 DNA SPEC QL NAA+PROBE: NEGATIVE

## 2019-05-06 ENCOUNTER — HOSPITAL ENCOUNTER (OUTPATIENT)
Dept: RADIOLOGY | Facility: OTHER | Age: 29
Discharge: HOME | End: 2019-05-06
Attending: OBSTETRICS & GYNECOLOGY
Payer: COMMERCIAL

## 2019-05-06 DIAGNOSIS — N63.20 LEFT BREAST MASS: ICD-10-CM

## 2019-05-06 PROCEDURE — 76642 ULTRASOUND BREAST LIMITED: CPT | Mod: TC,LT

## 2019-05-06 PROCEDURE — 76642 US BREAST LEFT LIMITED: ICD-10-PCS | Mod: 26,LT,, | Performed by: RADIOLOGY

## 2019-05-06 PROCEDURE — 76642 ULTRASOUND BREAST LIMITED: CPT | Mod: 26,LT,, | Performed by: RADIOLOGY

## 2019-06-26 ENCOUNTER — OFFICE VISIT (OUTPATIENT)
Dept: URGENT CARE | Facility: CLINIC | Age: 29
End: 2019-06-26
Payer: COMMERCIAL

## 2019-06-26 ENCOUNTER — OFFICE VISIT (OUTPATIENT)
Dept: FAMILY MEDICINE | Facility: CLINIC | Age: 29
End: 2019-06-26
Payer: COMMERCIAL

## 2019-06-26 VITALS
OXYGEN SATURATION: 99 % | BODY MASS INDEX: 23.89 KG/M2 | HEIGHT: 66 IN | SYSTOLIC BLOOD PRESSURE: 118 MMHG | WEIGHT: 148.63 LBS | DIASTOLIC BLOOD PRESSURE: 64 MMHG | HEART RATE: 81 BPM

## 2019-06-26 VITALS
RESPIRATION RATE: 16 BRPM | WEIGHT: 149.81 LBS | BODY MASS INDEX: 24.08 KG/M2 | SYSTOLIC BLOOD PRESSURE: 112 MMHG | DIASTOLIC BLOOD PRESSURE: 76 MMHG | TEMPERATURE: 98 F | OXYGEN SATURATION: 100 % | HEART RATE: 66 BPM | HEIGHT: 66 IN

## 2019-06-26 DIAGNOSIS — R31.9 URINARY TRACT INFECTION WITH HEMATURIA, SITE UNSPECIFIED: ICD-10-CM

## 2019-06-26 DIAGNOSIS — R10.32 LLQ PAIN: Primary | ICD-10-CM

## 2019-06-26 DIAGNOSIS — R10.9 ABDOMINAL PAIN, ACUTE: Primary | ICD-10-CM

## 2019-06-26 DIAGNOSIS — N39.0 URINARY TRACT INFECTION WITH HEMATURIA, SITE UNSPECIFIED: ICD-10-CM

## 2019-06-26 LAB
B-HCG UR QL: NEGATIVE
BILIRUB SERPL-MCNC: NEGATIVE MG/DL
BLOOD URINE, POC: 250
COLOR, POC UA: ABNORMAL
CTP QC/QA: YES
GLUCOSE UR QL STRIP: NORMAL
KETONES UR QL STRIP: ABNORMAL
LEUKOCYTE ESTERASE URINE, POC: 1
NITRITE, POC UA: NEGATIVE
PH, POC UA: 5
PROTEIN, POC: ABNORMAL
SPECIFIC GRAVITY, POC UA: 1.02
UROBILINOGEN, POC UA: NORMAL

## 2019-06-26 PROCEDURE — 3008F BODY MASS INDEX DOCD: CPT | Mod: CPTII,S$GLB,, | Performed by: NURSE PRACTITIONER

## 2019-06-26 PROCEDURE — 99214 PR OFFICE/OUTPT VISIT, EST, LEVL IV, 30-39 MIN: ICD-10-PCS | Mod: S$GLB,,, | Performed by: NURSE PRACTITIONER

## 2019-06-26 PROCEDURE — 99213 PR OFFICE/OUTPT VISIT, EST, LEVL III, 20-29 MIN: ICD-10-PCS | Mod: S$GLB,,, | Performed by: NURSE PRACTITIONER

## 2019-06-26 PROCEDURE — 99999 PR PBB SHADOW E&M-EST. PATIENT-LVL III: ICD-10-PCS | Mod: PBBFAC,,, | Performed by: NURSE PRACTITIONER

## 2019-06-26 PROCEDURE — 81002 POCT URINE DIPSTICK WITHOUT MICROSCOPE: ICD-10-PCS | Mod: S$GLB,,, | Performed by: NURSE PRACTITIONER

## 2019-06-26 PROCEDURE — 99999 PR PBB SHADOW E&M-EST. PATIENT-LVL IV: CPT | Mod: PBBFAC,,, | Performed by: NURSE PRACTITIONER

## 2019-06-26 PROCEDURE — 99999 PR PBB SHADOW E&M-EST. PATIENT-LVL III: CPT | Mod: PBBFAC,,, | Performed by: NURSE PRACTITIONER

## 2019-06-26 PROCEDURE — 81025 URINE PREGNANCY TEST: CPT | Mod: S$GLB,,, | Performed by: NURSE PRACTITIONER

## 2019-06-26 PROCEDURE — 99999 PR PBB SHADOW E&M-EST. PATIENT-LVL IV: ICD-10-PCS | Mod: PBBFAC,,, | Performed by: NURSE PRACTITIONER

## 2019-06-26 PROCEDURE — 81025 POCT URINE PREGNANCY: ICD-10-PCS | Mod: S$GLB,,, | Performed by: NURSE PRACTITIONER

## 2019-06-26 PROCEDURE — 87086 URINE CULTURE/COLONY COUNT: CPT

## 2019-06-26 PROCEDURE — 3008F PR BODY MASS INDEX (BMI) DOCUMENTED: ICD-10-PCS | Mod: CPTII,S$GLB,, | Performed by: NURSE PRACTITIONER

## 2019-06-26 PROCEDURE — 81002 URINALYSIS NONAUTO W/O SCOPE: CPT | Mod: S$GLB,,, | Performed by: NURSE PRACTITIONER

## 2019-06-26 PROCEDURE — 99214 OFFICE O/P EST MOD 30 MIN: CPT | Mod: S$GLB,,, | Performed by: NURSE PRACTITIONER

## 2019-06-26 PROCEDURE — 99213 OFFICE O/P EST LOW 20 MIN: CPT | Mod: S$GLB,,, | Performed by: NURSE PRACTITIONER

## 2019-06-26 RX ORDER — PHENAZOPYRIDINE HYDROCHLORIDE 200 MG/1
200 TABLET, FILM COATED ORAL
Qty: 6 TABLET | Refills: 0 | Status: SHIPPED | OUTPATIENT
Start: 2019-06-26 | End: 2019-06-28

## 2019-06-26 RX ORDER — CIPROFLOXACIN 500 MG/1
500 TABLET ORAL 2 TIMES DAILY
Qty: 6 TABLET | Refills: 0 | Status: SHIPPED | OUTPATIENT
Start: 2019-06-26 | End: 2019-06-29

## 2019-06-26 NOTE — PATIENT INSTRUCTIONS
Plan:  Lab work POCT UA, UPT  Advise increase p.o. fluids-- water/juice & rest  Meds:  Cipro & Pyridium / no refills  Practice good handwashing.  Discussed further evaluation at OB/GYN/ or ER with  ultrasound  Increase fluids (avoid caffeine or sugary beverages as these will irritate the bladder).   Take your antibiotics exactly as prescribed and make sure to complete entire course even if you begin to feel better. This will prevent recurrence of infection and antibiotic resistance.   Given an antibiotic that covers most bacteria that cause urinary tract infections, however, if your urine culture shows that you have any bacterial resistance to the antibiotic you were prescribed or an unusual bacterial strain, we will notify you and make any necessary changes. Urine cultures usually result in about three days.   Please follow up with your primary care provider if your symptoms do not improve within 2-3 days or sooner for any new or worsening symptoms.  For any severe pain, bright red blood in urine, difficulty urinating, fever that does not improve with Tylenol or Ibuprofen, inability to urinate, incontinence of urine or bowels, or for any other urgent concerns, please go to the ER for immediate evaluation. .

## 2019-06-26 NOTE — PATIENT INSTRUCTIONS
[facility],     We are always striving for excellence. Should you receive a patient experience survey in the mail, we would appreciate if you would take a few moments to give us your feedback. These surveys let us know our strengths as well as areas of opportunity for improvement to better serve you.    Thank you for your time,  Dash Chacon MA

## 2019-06-26 NOTE — PROGRESS NOTES
Subjective:       Patient ID: Coby Whitehead is a 29 y.o. female.    Chief Complaint: Abdominal Pain (Urgent care f/u. vomited after pt left urgent care)    HPI   Patient presents today with complaints of pain to left lower abdomen. She describes pain as dull but also experiencing a sharp cramp-like pain. Today while driving she felt dizzy and light headed and vomited once.  She has BMs once or twice daily, not straining and they are soft. No burning with urination, frequency or urgency. She is spotting 2/t being on her last year with IUD. She saw UC today in [place] and was prescribed cipro for a UTI, she felt like that was not the correct diagnosis and came to our clinic. It was suggested by UC to go to the ED for abdominal US and she deferred due to cost.       Review of Systems   Constitutional: Negative for chills, fatigue and fever.   HENT: Negative.    Respiratory: Negative.    Cardiovascular: Negative.    Gastrointestinal: Positive for abdominal pain and vomiting. Negative for blood in stool, constipation, diarrhea and nausea.   Genitourinary: Positive for flank pain (left sided). Negative for dysuria, frequency, hematuria, pelvic pain and urgency.   Neurological: Positive for dizziness. Negative for light-headedness and headaches.         Objective:      Vitals:    06/26/19 1550   BP: 118/64   Pulse: 81     Physical Exam   Constitutional: She is oriented to person, place, and time. She appears well-developed and well-nourished.   HENT:   Head: Normocephalic and atraumatic.   Eyes: No scleral icterus.   Cardiovascular: Normal rate, regular rhythm and normal heart sounds. Exam reveals no gallop and no friction rub.   No murmur heard.  Pulmonary/Chest: Effort normal and breath sounds normal. No respiratory distress. She has no wheezes. She has no rales.   Abdominal: Soft. Bowel sounds are normal. She exhibits no distension and no mass. There is no hepatosplenomegaly. There is tenderness in the left  lower quadrant. There is CVA tenderness (left sided). There is no rebound. No hernia.   Musculoskeletal: She exhibits no edema.   Neurological: She is alert and oriented to person, place, and time.   Skin: Skin is warm and dry. Capillary refill takes less than 2 seconds.   Psychiatric: She has a normal mood and affect. Her behavior is normal. Thought content normal.       Assessment:       1. LLQ pain        Plan:     POCT urine - positive for trace leukocytes, neg for ketones  Urine culture  Advised pt to begin taking cipro as ordered by     Abdominal ultrasound    Further recommendations to follow after above.    If symptoms worsen go to ED for eval - discussed with pt and she agreed to do this if symptoms should worsen

## 2019-06-26 NOTE — PROGRESS NOTES
Chief complaint/reason for visit:  Left Lower abdominal pain    History of present illness:  -29 year-old female complains of Left Lower abdominal pain onset this morning. Denies seeking emergency room treatment.  Patient admits traveling from [place] to [place], stopped to be checked.   Patient tried over-the-counter medications with little relief.  Patient denies chest pain, shortness of breath, nausea, vomiting, diarrhea, constipation, fever, dysuria and no body aches.  Discussed with patient the need for further evaluation with ultrasound at the emergency room/ patient deferred.  Discussed with patient need for POCT UA, UPT.  Patient admits will follow up with OBGYN.         Past Medical History:   Diagnosis Date    ASCUS with positive high risk HPV cervical- 2016 03/22/2016    colposcopy    IUD (intrauterine device) in place 2015    Mirena    Rhinitis     nonallergic/seasonal          Past Surgical History:   Procedure Laterality Date    COLPOSCOPY  06/14/2017    KNEE ARTHROSCOPY W/ ACL RECONSTRUCTION Left     KNEE ARTHROSCOPY W/ MENISCAL REPAIR Right     NASAL POLYP EXCISION      w/septoplasty and sinus ostiotomy    WISDOM TOOTH EXTRACTION              Social History     Socioeconomic History    Marital status: Single     Spouse name: Not on file    Number of children: Not on file    Years of education: Not on file    Highest education level: Not on file   Occupational History    Not on file   Social Needs    Financial resource strain: Not on file    Food insecurity:     Worry: Not on file     Inability: Not on file    Transportation needs:     Medical: Not on file     Non-medical: Not on file   Tobacco Use    Smoking status: Never Smoker    Smokeless tobacco: Never Used   Substance and Sexual Activity    Alcohol use: Yes     Alcohol/week: 1.2 oz     Types: 2 Standard drinks or equivalent per week    Drug use: No    Sexual activity: Not Currently     Birth control/protection: IUD    Lifestyle    Physical activity:     Days per week: Not on file     Minutes per session: Not on file    Stress: Not on file   Relationships    Social connections:     Talks on phone: Not on file     Gets together: Not on file     Attends Religion service: Not on file     Active member of club or organization: Not on file     Attends meetings of clubs or organizations: Not on file     Relationship status: Not on file   Other Topics Concern    Not on file   Social History Narrative    The patient does exercise regularly (6d/wk).      She is satisfied with weight.    Rates diet as good.    She does not drink at least 1/2 gallon water daily.    She drinks 2 coffee/tea/caffeine-containing soft drinks daily.    Total sleep time at night is 6-7 hours.    She works 70 hours per week.    She does wear seat belts.    Hobbies include sewing.    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       Family History   Problem Relation Age of Onset    Hypertension Mother     No Known Problems Father     No Known Problems Brother     No Known Problems Daughter     No Known Problems Maternal Aunt     No Known Problems Maternal Uncle     No Known Problems Paternal Aunt     No Known Problems Paternal Uncle     No Known Problems Maternal Grandmother     No Known Problems Maternal Grandfather     No Known Problems Paternal Grandmother     No Known Problems Paternal Grandfather     Breast cancer Neg Hx     Ovarian cancer Neg Hx     Colon cancer Neg Hx     BRCA 1/2 Neg Hx        ROS:  Gen.: Denies fever, fatigue, weight loss  Skin:  Denies  no rashes, itching or changes in skin color or texture  HEENT: Denies headache, nasal congestion, sneezing  Nodes: No masses or lesions  Chest: Denies cyanosis, wheezing, cough and sputum production  Cardiovascular: Denies chest pain, shortness of breath  Abdomen: Reports left lower abdominal pain,  denies nausea, vomiting, diarrhea  Urinary: Reports no dysuria   Musculoskeletal: no joint stiffness, swelling. Denies back pain  Neurologic: History of seizures, paralysis, alteration of gait or coordination  Psychiatric: Denies mood swings, depression or suicidal    PE:  Appearance: Well-nourished, well-developed, in mild distress  V/S: Reviewed.  Skin: No masses, rashes or lesions  HEENT: turbinates pink, Mucus membranes okay, TMs clear bilateral   Chest: Lungs clear to auscultation.  Cardiovascular: Regular rate and rhythm.  Abdomen: Soft with left lower abdomen tenderness on palpation, with active bowel sounds  Musculoskeletal: Motor: 5/5 strength major flexors/extensors.  Neurologic: No sensory deficits. Gait and posture: Normal, No cerebellar signs.   Mental status: Patient awake, alert and oriented    Plan:  Lab work POCT UA, UPT  Advise increase p.o. fluids-- water/juice & rest  Meds:  Cipro & Pyridium / no refills  Practice good handwashing.  Discussed further evaluation at OB/GYN/ or ER with  ultrasound  Increase fluids (avoid caffeine or sugary beverages as these will irritate the bladder).   Take your antibiotics exactly as prescribed and make sure to complete entire course even if you begin to feel better. This will prevent recurrence of infection and antibiotic resistance.   Given an antibiotic that covers most bacteria that cause urinary tract infections, however, if your urine culture shows that you have any bacterial resistance to the antibiotic you were prescribed or an unusual bacterial strain, we will notify you and make any necessary changes. Urine cultures usually result in about three days.   Please follow up with your primary care provider if your symptoms do not improve within 2-3 days or sooner for any new or worsening symptoms.  For any severe pain, bright red blood in urine, difficulty urinating, fever that does not improve with Tylenol or Ibuprofen, inability to urinate, incontinence of  urine or bowels, or for any other urgent concerns, please go to the ER for immediate evaluation. .      Diagnosis:  Abdominal pain   UTI with hematuria

## 2019-06-28 ENCOUNTER — HOSPITAL ENCOUNTER (OUTPATIENT)
Dept: RADIOLOGY | Facility: OTHER | Age: 29
Discharge: HOME | End: 2019-06-28
Attending: NURSE PRACTITIONER
Payer: COMMERCIAL

## 2019-06-28 ENCOUNTER — TELEPHONE (OUTPATIENT)
Dept: FAMILY MEDICINE | Facility: CLINIC | Age: 29
End: 2019-06-28

## 2019-06-28 ENCOUNTER — PATIENT MESSAGE (OUTPATIENT)
Dept: FAMILY MEDICINE | Facility: CLINIC | Age: 29
End: 2019-06-28

## 2019-06-28 DIAGNOSIS — R10.32 LLQ PAIN: Primary | ICD-10-CM

## 2019-06-28 DIAGNOSIS — R10.32 LLQ PAIN: ICD-10-CM

## 2019-06-28 LAB
BACTERIA UR CULT: NORMAL
BACTERIA UR CULT: NORMAL

## 2019-06-28 PROCEDURE — 76700 US EXAM ABDOM COMPLETE: CPT | Mod: 26,,, | Performed by: RADIOLOGY

## 2019-06-28 PROCEDURE — 76700 US ABDOMEN COMPLETE: ICD-10-PCS | Mod: 26,,, | Performed by: RADIOLOGY

## 2019-06-28 PROCEDURE — 76700 US EXAM ABDOM COMPLETE: CPT | Mod: TC

## 2019-06-28 NOTE — TELEPHONE ENCOUNTER
Radiologist called to speak with Nurse Practitioner Gina Joshua in regards to the patient's Ultrasound.  The Ultrasound of the abdomen shows mild degenerative left hydronephrosis. Therefore they are requesting an order for a CT Scan of the abdomen and pelvis if patient can tolerate.     Please advise. Thank you!

## 2019-07-01 ENCOUNTER — PATIENT MESSAGE (OUTPATIENT)
Dept: FAMILY MEDICINE | Facility: CLINIC | Age: 29
End: 2019-07-01

## 2019-07-02 ENCOUNTER — HOSPITAL ENCOUNTER (OUTPATIENT)
Dept: RADIOLOGY | Facility: OTHER | Age: 29
Discharge: HOME | End: 2019-07-02
Attending: NURSE PRACTITIONER
Payer: COMMERCIAL

## 2019-07-02 DIAGNOSIS — R10.32 LLQ PAIN: ICD-10-CM

## 2019-07-02 PROCEDURE — 74177 CT ABD & PELVIS W/CONTRAST: CPT | Mod: 26,,, | Performed by: RADIOLOGY

## 2019-07-02 PROCEDURE — 25500020 PHARM REV CODE 255: Performed by: NURSE PRACTITIONER

## 2019-07-02 PROCEDURE — 74177 CT ABDOMEN PELVIS WITH CONTRAST: ICD-10-PCS | Mod: 26,,, | Performed by: RADIOLOGY

## 2019-07-02 PROCEDURE — 74177 CT ABD & PELVIS W/CONTRAST: CPT | Mod: TC

## 2019-07-02 RX ADMIN — IOHEXOL 75 ML: 350 INJECTION, SOLUTION INTRAVENOUS at 12:07

## 2019-07-02 RX ADMIN — IOHEXOL 30 ML: 350 INJECTION, SOLUTION INTRAVENOUS at 12:07

## 2020-04-08 ENCOUNTER — PATIENT MESSAGE (OUTPATIENT)
Dept: OBSTETRICS AND GYNECOLOGY | Facility: CLINIC | Age: 30
End: 2020-04-08

## 2020-07-17 ENCOUNTER — OFFICE VISIT (OUTPATIENT)
Dept: OBSTETRICS AND GYNECOLOGY | Facility: CLINIC | Age: 30
End: 2020-07-17
Attending: OBSTETRICS & GYNECOLOGY
Payer: COMMERCIAL

## 2020-07-17 ENCOUNTER — LAB VISIT (OUTPATIENT)
Dept: LAB | Facility: OTHER | Age: 30
End: 2020-07-17
Attending: OBSTETRICS & GYNECOLOGY
Payer: COMMERCIAL

## 2020-07-17 VITALS
WEIGHT: 151.25 LBS | DIASTOLIC BLOOD PRESSURE: 68 MMHG | BODY MASS INDEX: 24.41 KG/M2 | SYSTOLIC BLOOD PRESSURE: 120 MMHG

## 2020-07-17 DIAGNOSIS — Z11.3 SCREEN FOR STD (SEXUALLY TRANSMITTED DISEASE): ICD-10-CM

## 2020-07-17 DIAGNOSIS — Z01.419 WELL WOMAN EXAM WITH ROUTINE GYNECOLOGICAL EXAM: Primary | ICD-10-CM

## 2020-07-17 DIAGNOSIS — Z86.19 HISTORY OF HPV INFECTION: ICD-10-CM

## 2020-07-17 DIAGNOSIS — N63.20 BREAST MASS, LEFT: ICD-10-CM

## 2020-07-17 PROCEDURE — 99999 PR PBB SHADOW E&M-EST. PATIENT-LVL III: CPT | Mod: PBBFAC,,, | Performed by: OBSTETRICS & GYNECOLOGY

## 2020-07-17 PROCEDURE — 88141 PR  CYTOPATH CERV/VAG INTERPRET: ICD-10-PCS | Mod: ,,, | Performed by: PATHOLOGY

## 2020-07-17 PROCEDURE — 36415 COLL VENOUS BLD VENIPUNCTURE: CPT

## 2020-07-17 PROCEDURE — 86592 SYPHILIS TEST NON-TREP QUAL: CPT

## 2020-07-17 PROCEDURE — 86703 HIV-1/HIV-2 1 RESULT ANTBDY: CPT

## 2020-07-17 PROCEDURE — 99395 PR PREVENTIVE VISIT,EST,18-39: ICD-10-PCS | Mod: S$GLB,,, | Performed by: OBSTETRICS & GYNECOLOGY

## 2020-07-17 PROCEDURE — 87624 HPV HI-RISK TYP POOLED RSLT: CPT

## 2020-07-17 PROCEDURE — 88175 CYTOPATH C/V AUTO FLUID REDO: CPT | Performed by: PATHOLOGY

## 2020-07-17 PROCEDURE — 80074 ACUTE HEPATITIS PANEL: CPT

## 2020-07-17 PROCEDURE — 99395 PREV VISIT EST AGE 18-39: CPT | Mod: S$GLB,,, | Performed by: OBSTETRICS & GYNECOLOGY

## 2020-07-17 PROCEDURE — 88141 CYTOPATH C/V INTERPRET: CPT | Mod: ,,, | Performed by: PATHOLOGY

## 2020-07-17 PROCEDURE — 99999 PR PBB SHADOW E&M-EST. PATIENT-LVL III: ICD-10-PCS | Mod: PBBFAC,,, | Performed by: OBSTETRICS & GYNECOLOGY

## 2020-07-17 PROCEDURE — 87491 CHLMYD TRACH DNA AMP PROBE: CPT

## 2020-07-17 NOTE — PROGRESS NOTES
CC: Well woman exam    [facility] Erinn Whitehead is a 30 y.o. female  presents for well woman exam.  LMP: No LMP recorded (exact date). (Menstrual status: Birth Control)..  No issues, problems, or complaints.  mirena is  as of march considering other options and possible pregnancy next year. History of left breast mass, likely benign but needs follow up.    Past Medical History:   Diagnosis Date    ASCUS with positive high risk HPV cervical- 2016 2016    colposcopy    IUD (intrauterine device) in place     Mirena    Rhinitis     nonallergic/seasonal      Past Surgical History:   Procedure Laterality Date    COLPOSCOPY  2017    KNEE ARTHROSCOPY W/ ACL RECONSTRUCTION Left     KNEE ARTHROSCOPY W/ MENISCAL REPAIR Right     NASAL POLYP EXCISION      w/septoplasty and sinus ostiotomy    WISDOM TOOTH EXTRACTION       Social History     Socioeconomic History    Marital status: Single     Spouse name: Not on file    Number of children: Not on file    Years of education: Not on file    Highest education level: Not on file   Occupational History     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[employer], primary care    Social Needs    Financial resource strain: Not on file    Food insecurity     Worry: Not on file     Inability: Not on file    Transportation needs     Medical: Not on file     Non-medical: Not on file   Tobacco Use    Smoking status: Never Smoker    Smokeless tobacco: Never Used   Substance and Sexual Activity    Alcohol use: Yes     Alcohol/week: 2.0 standard drinks     Types: 2 Standard drinks or equivalent per week     Drinks per session: 1 or 2    Drug use: No    Sexual activity: Not Currently     Partners: Male     Birth control/protection: I.U.D.   Lifestyle    Physical activity     Days per week: Not on file     Minutes per session: Not on file    Stress: Not on file   Relationships    Social connections     Talks on phone: Not on file     Gets together: Not on file      Attends Restoration service: Not on file     Active member of club or organization: Not on file     Attends meetings of clubs or organizations: Not on file     Relationship status: Not on file   Other Topics Concern    Not on file   Social History Narrative    The patient does exercise regularly (6d/wk).      She is satisfied with weight.    Rates diet as good.    She does not drink at least 1/2 gallon water daily.    She drinks 2 coffee/tea/caffeine-containing soft drinks daily.    Total sleep time at night is 6-7 hours.    She works 70 hours per week.    She does wear seat belts.    Hobbies include sewing.    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     Family History   Problem Relation Age of Onset    Hypertension Mother     No Known Problems Father     No Known Problems Brother     No Known Problems Daughter     No Known Problems Maternal Aunt     No Known Problems Maternal Uncle     No Known Problems Paternal Aunt     No Known Problems Paternal Uncle     No Known Problems Maternal Grandmother     No Known Problems Maternal Grandfather     No Known Problems Paternal Grandmother     No Known Problems Paternal Grandfather     Breast cancer Neg Hx     Ovarian cancer Neg Hx     Colon cancer Neg Hx     BRCA 1/2 Neg Hx      OB History        0    Para   0    Term   0       0    AB   0    Living   0       SAB   0    TAB   0    Ectopic   0    Multiple   0    Live Births               Obstetric Comments   Menarche age 11.   Menses nearly absent on IUD.  History of abnormal PAP smear: YES.  History of sexually transmitted disease:  YES: HPV.                  /68 (BP Location: Right arm, Patient Position: Sitting)   Wt 68.6 kg (151 lb 3.8 oz)   LMP  (Exact Date)   BMI 24.41 kg/m²       ROS:  GENERAL: Denies weight gain or weight loss. Feeling well overall.   SKIN: Denies rash or lesions.   HEAD:  Denies head injury or headache.   NODES: Denies enlarged lymph nodes.   CHEST: Denies chest pain or shortness of breath.   CARDIOVASCULAR: Denies palpitations or left sided chest pain.   ABDOMEN: No abdominal pain, constipation, diarrhea, nausea, vomiting or rectal bleeding.   URINARY: No frequency, dysuria, hematuria, or burning on urination.  REPRODUCTIVE: See HPI.   BREASTS: The patient performs breast self-examination and denies pain, lumps, or nipple discharge.   HEMATOLOGIC: No easy bruisability or excessive bleeding.   MUSCULOSKELETAL: Denies joint pain or swelling.   NEUROLOGIC: Denies syncope or weakness.   PSYCHIATRIC: Denies depression, anxiety or mood swings.    PHYSICAL EXAM:  APPEARANCE: Well nourished, well developed, in no acute distress.  AFFECT: WNL, alert and oriented x 3  SKIN: No acne or hirsutism  NECK: Neck symmetric without masses or thyromegaly  NODES: No inguinal, cervical, axillary, or femoral lymph node enlargement  CHEST: Good respiratory effect  ABDOMEN: Soft.  No tenderness or masses.  No hepatosplenomegaly.  No hernias.  BREASTS: Symmetrical, no skin changes or visible lesions.  No palpable masses, nipple discharge bilaterally.  PELVIC: Normal external genitalia without lesions.  Normal hair distribution.  Adequate perineal body, normal urethral meatus.  Vagina moist and well rugated without lesions or discharge.  Cervix pink, without lesions, discharge or tenderness.  No significant cystocele or rectocele.  Bimanual exam shows uterus to be normal size, regular, mobile and nontender.  Adnexa without masses or tenderness.    EXTREMITIES: No edema.    Well woman exam with routine gynecological exam  -     Liquid-Based Pap Smear, Screening  -     HPV High Risk Genotypes, PCR    History of HPV infection  -     Liquid-Based Pap Smear, Screening  -     HPV High Risk Genotypes, PCR    Screen for STD (sexually transmitted disease)  -     HIV 1/2 Ag/Ab (4th Gen); Future; Expected date:  07/17/2020  -     RPR; Future; Expected date: 07/17/2020  -     Hepatitis Panel, Acute; Future; Expected date: 07/17/2020  -     C. trachomatis/N. gonorrhoeae by AMP DNA    Breast mass, left  -     US Breast Left Complete; Future; Expected date: 07/17/2020            Patient was counseled today on A.C.S. Pap guidelines and recommendations for yearly pelvic exams, mammograms and monthly self breast exams; to see her PCP for other health maintenance.     No follow-ups on file.

## 2020-07-20 LAB
HAV IGM SERPL QL IA: NEGATIVE
HBV CORE IGM SERPL QL IA: NEGATIVE
HBV SURFACE AG SERPL QL IA: NEGATIVE
HCV AB SERPL QL IA: NEGATIVE
HIV 1+2 AB+HIV1 P24 AG SERPL QL IA: NEGATIVE
RPR SER QL: NORMAL

## 2020-07-22 LAB
C TRACH DNA SPEC QL NAA+PROBE: NOT DETECTED
N GONORRHOEA DNA SPEC QL NAA+PROBE: NOT DETECTED

## 2020-07-23 LAB
HPV HR 12 DNA SPEC QL NAA+PROBE: POSITIVE
HPV16 AG SPEC QL: NEGATIVE
HPV18 DNA SPEC QL NAA+PROBE: NEGATIVE

## 2020-07-30 ENCOUNTER — HOSPITAL ENCOUNTER (OUTPATIENT)
Dept: RADIOLOGY | Facility: OTHER | Age: 30
Discharge: HOME | End: 2020-07-30
Attending: OBSTETRICS & GYNECOLOGY
Payer: COMMERCIAL

## 2020-07-30 DIAGNOSIS — N63.20 BREAST MASS, LEFT: ICD-10-CM

## 2020-07-30 LAB
FINAL PATHOLOGIC DIAGNOSIS: ABNORMAL
Lab: ABNORMAL

## 2020-07-30 PROCEDURE — 76642 US BREAST LEFT LIMITED: ICD-10-PCS | Mod: 26,LT,, | Performed by: RADIOLOGY

## 2020-07-30 PROCEDURE — 76642 ULTRASOUND BREAST LIMITED: CPT | Mod: TC,LT

## 2020-07-30 PROCEDURE — 76642 ULTRASOUND BREAST LIMITED: CPT | Mod: 26,LT,, | Performed by: RADIOLOGY

## 2020-08-06 ENCOUNTER — TELEPHONE (OUTPATIENT)
Dept: OBSTETRICS AND GYNECOLOGY | Facility: CLINIC | Age: 30
End: 2020-08-06

## 2020-09-01 ENCOUNTER — PROCEDURE VISIT (OUTPATIENT)
Dept: OBSTETRICS AND GYNECOLOGY | Facility: CLINIC | Age: 30
End: 2020-09-01
Attending: OBSTETRICS & GYNECOLOGY
Payer: COMMERCIAL

## 2020-09-01 VITALS
DIASTOLIC BLOOD PRESSURE: 76 MMHG | WEIGHT: 155.63 LBS | SYSTOLIC BLOOD PRESSURE: 124 MMHG | HEIGHT: 66 IN | BODY MASS INDEX: 25.01 KG/M2

## 2020-09-01 DIAGNOSIS — Z30.432 AWAITING REMOVAL OF CONTRACEPTIVE INTRAUTERINE DEVICE (IUD): ICD-10-CM

## 2020-09-01 DIAGNOSIS — R87.612 LOW GRADE SQUAMOUS INTRAEPITHELIAL LESION (LGSIL) ON CERVICAL PAP SMEAR: Primary | ICD-10-CM

## 2020-09-01 DIAGNOSIS — Z30.432 ENCOUNTER FOR IUD REMOVAL: ICD-10-CM

## 2020-09-01 PROCEDURE — 57454 BX/CURETT OF CERVIX W/SCOPE: CPT | Mod: S$GLB,,, | Performed by: OBSTETRICS & GYNECOLOGY

## 2020-09-01 PROCEDURE — 58301 REMOVE INTRAUTERINE DEVICE: CPT | Mod: 51,S$GLB,, | Performed by: OBSTETRICS & GYNECOLOGY

## 2020-09-01 PROCEDURE — 57454 COLPOSCOPY: ICD-10-PCS | Mod: S$GLB,,, | Performed by: OBSTETRICS & GYNECOLOGY

## 2020-09-01 PROCEDURE — 88305 TISSUE EXAM BY PATHOLOGIST: ICD-10-PCS | Mod: 26,,, | Performed by: PATHOLOGY

## 2020-09-01 PROCEDURE — 58301 PR REMOVE, INTRAUTERINE DEVICE: ICD-10-PCS | Mod: 51,S$GLB,, | Performed by: OBSTETRICS & GYNECOLOGY

## 2020-09-01 PROCEDURE — 88305 TISSUE EXAM BY PATHOLOGIST: CPT | Mod: 26,,, | Performed by: PATHOLOGY

## 2020-09-01 PROCEDURE — 88305 TISSUE EXAM BY PATHOLOGIST: CPT | Performed by: PATHOLOGY

## 2020-09-01 NOTE — PROCEDURES
Colposcopy    Date/Time: 9/1/2020 2:30 PM  Performed by: Syeda Silva, DO  Authorized by: Syeda Silva, DO     Consent Done?:  Yes (Written)  Timeout:Immediately prior to procedure a time out was called to verify the correct patient, procedure, equipment, support staff and site/side marked as required  Assistants?: Yes    List of assistants:  Nidhi Donnelly MA    Colposcopy Site:  Cervix  Position:  Supine  Acrowhite Lesion? Yes    Atypical Vessels: No    Transformation Zone Adequate?: Yes    Biopsy?: Yes         Location:  Cervix ((6 00 and 12 00))  ECC Performed?: Yes    LEEP Performed?: No     Patient tolerated the procedure well with no immediate complications.   Post-operative instructions were provided for the patient.   Patient was discharged and will follow up if any complications occur

## 2020-09-01 NOTE — PROCEDURES
DATE: 2020    TIME: 3:36 PM    PROCEDURE:  Mirena removal    INDICATION: Coby Whitehead is a 30 y.o. female who presents for IUD removal secondary to  device  .      PRE-IUD REMOVAL COUNSELING:  The patient was advised of minimal risks of bleeding and pain and she agrees to proceed.    PROCEDURE:  TIME OUT PERFORMED.  IUD strings were  visualized at the os. IUD removed with gentle traction after completion of colposcopy  The patient tolerated the procedure well.    COMPLICATIONS: None    PATIENT DISPOSITION: The patient tolerated the procedure well.    ASSESSMENT:  Contraceptive Management / Removal IUD. V25.0.    POST IUD REMOVAL COUNSELING:  Expect period-like flow to occur after Mirena IUD removal and periods to return to pre-IUD pattern.  Manage post IUD removal cramping with NSAIDs, Tylenol or Rx per [facility].    POST IUD REMOVAL CONTRACEPTION:  If planning pregnancy, patient instructed to begin prenatal vitamins.     Counseling lasted approximately 15 minutes and all her questions were answered.    FOLLOW-UP: With me for annual gyn exam or prn.    Syeda Silva DO 2020 3:36 PM

## 2020-09-04 ENCOUNTER — PATIENT MESSAGE (OUTPATIENT)
Dept: OBSTETRICS AND GYNECOLOGY | Facility: CLINIC | Age: 30
End: 2020-09-04

## 2020-09-04 LAB
FINAL PATHOLOGIC DIAGNOSIS: NORMAL
GROSS: NORMAL

## 2021-01-04 ENCOUNTER — PATIENT MESSAGE (OUTPATIENT)
Dept: ADMINISTRATIVE | Facility: HOSPITAL | Age: 31
End: 2021-01-04

## 2021-04-26 ENCOUNTER — PATIENT MESSAGE (OUTPATIENT)
Dept: RESEARCH | Facility: HOSPITAL | Age: 31
End: 2021-04-26